# Patient Record
Sex: MALE | Race: WHITE | NOT HISPANIC OR LATINO | Employment: OTHER | URBAN - METROPOLITAN AREA
[De-identification: names, ages, dates, MRNs, and addresses within clinical notes are randomized per-mention and may not be internally consistent; named-entity substitution may affect disease eponyms.]

---

## 2018-01-18 ENCOUNTER — TELEPHONE (OUTPATIENT)
Dept: FAMILY MEDICINE CLINIC | Facility: CLINIC | Age: 73
End: 2018-01-18

## 2018-01-18 ENCOUNTER — OFFICE VISIT (OUTPATIENT)
Dept: FAMILY MEDICINE CLINIC | Facility: CLINIC | Age: 73
End: 2018-01-18

## 2018-01-18 VITALS
WEIGHT: 176 LBS | DIASTOLIC BLOOD PRESSURE: 66 MMHG | OXYGEN SATURATION: 96 % | SYSTOLIC BLOOD PRESSURE: 114 MMHG | HEART RATE: 70 BPM

## 2018-01-18 DIAGNOSIS — I63.9 CEREBROVASCULAR ACCIDENT (CVA), UNSPECIFIED MECHANISM (HCC): ICD-10-CM

## 2018-01-18 DIAGNOSIS — I25.10 CORONARY ARTERY DISEASE INVOLVING NATIVE HEART WITHOUT ANGINA PECTORIS, UNSPECIFIED VESSEL OR LESION TYPE: Primary | ICD-10-CM

## 2018-01-18 DIAGNOSIS — F17.200 TOBACCO DEPENDENCE: ICD-10-CM

## 2018-01-18 DIAGNOSIS — E78.5 HYPERLIPIDEMIA, UNSPECIFIED HYPERLIPIDEMIA TYPE: ICD-10-CM

## 2018-01-18 DIAGNOSIS — J43.9 PULMONARY EMPHYSEMA, UNSPECIFIED EMPHYSEMA TYPE (HCC): ICD-10-CM

## 2018-01-18 DIAGNOSIS — I44.2 COMPLETE HEART BLOCK (HCC): ICD-10-CM

## 2018-01-18 DIAGNOSIS — I10 HYPERTENSION, UNSPECIFIED TYPE: ICD-10-CM

## 2018-01-18 PROBLEM — J44.9 COPD (CHRONIC OBSTRUCTIVE PULMONARY DISEASE) (HCC): Status: ACTIVE | Noted: 2018-01-18

## 2018-01-18 PROBLEM — I73.9 PVD (PERIPHERAL VASCULAR DISEASE) (HCC): Status: ACTIVE | Noted: 2018-01-18

## 2018-01-18 PROBLEM — IMO0002 SQUAMOUS CELL CARCINOMA: Status: ACTIVE | Noted: 2018-01-18

## 2018-01-18 PROCEDURE — 99214 OFFICE O/P EST MOD 30 MIN: CPT | Performed by: INTERNAL MEDICINE

## 2018-01-18 RX ORDER — RAMIPRIL 2.5 MG/1
2.5 CAPSULE ORAL DAILY
COMMUNITY
Start: 2018-01-03 | End: 2018-02-27 | Stop reason: SDUPTHER

## 2018-01-18 RX ORDER — METOPROLOL SUCCINATE 50 MG/1
25 TABLET, EXTENDED RELEASE ORAL DAILY
Refills: 0 | COMMUNITY
Start: 2017-12-21 | End: 2018-01-18 | Stop reason: DRUGHIGH

## 2018-01-18 RX ORDER — GEMFIBROZIL 600 MG/1
600 TABLET, FILM COATED ORAL 2 TIMES DAILY
COMMUNITY
Start: 2017-11-23 | End: 2018-02-27 | Stop reason: SDUPTHER

## 2018-01-18 RX ORDER — CLOPIDOGREL BISULFATE 75 MG/1
75 TABLET ORAL DAILY
COMMUNITY
Start: 2017-11-21 | End: 2018-02-27 | Stop reason: SDUPTHER

## 2018-01-18 RX ORDER — HYDROCHLOROTHIAZIDE 12.5 MG/1
12.5 TABLET ORAL DAILY
COMMUNITY
Start: 2017-11-08 | End: 2018-01-18

## 2018-01-18 RX ORDER — ROSUVASTATIN CALCIUM 40 MG/1
40 TABLET, COATED ORAL DAILY
COMMUNITY
Start: 2017-11-04 | End: 2018-02-27 | Stop reason: SDUPTHER

## 2018-01-18 RX ORDER — CILOSTAZOL 100 MG/1
100 TABLET ORAL 2 TIMES DAILY
COMMUNITY
Start: 2017-11-08 | End: 2018-02-27 | Stop reason: SDUPTHER

## 2018-01-18 RX ORDER — EZETIMIBE 10 MG/1
10 TABLET ORAL DAILY
COMMUNITY
Start: 2017-11-23 | End: 2018-01-18

## 2018-01-18 RX ORDER — NICOTINE 21 MG/24HR
1 PATCH, TRANSDERMAL 24 HOURS TRANSDERMAL EVERY 24 HOURS
Qty: 28 PATCH | Refills: 2 | Status: SHIPPED | OUTPATIENT
Start: 2018-01-18 | End: 2019-05-07 | Stop reason: SDUPTHER

## 2018-01-18 RX ORDER — ALBUTEROL SULFATE 90 UG/1
AEROSOL, METERED RESPIRATORY (INHALATION)
Refills: 0 | COMMUNITY
Start: 2017-12-21 | End: 2020-01-01 | Stop reason: SDUPTHER

## 2018-01-18 NOTE — TELEPHONE ENCOUNTER
PT'S WIFE CALLED BACK CONCERNING A CORRECTION ON MEDS, THE METROPOLOL 50MG 1PO QD SHOULD BE 25MG 1PO QD PLEASE CORRECT. THE SUMMARY GIVEN AT CHECK OUT IT SAYS METROPOLOL 50MG ITS WRONG PER PT. NOTHING TO BE ORDERED.

## 2018-01-18 NOTE — TELEPHONE ENCOUNTER
Current med list has Metoprolol 50mg 1/2 po qd, which is 25mg.     I have updated the med list to reflect 25mg qd.

## 2018-01-18 NOTE — PROGRESS NOTES
72M here to establish care.  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  11/17 - hospitalized in [place] for stroke, presented with slurred speech and gait disturbance.  Started on plavix.  JR showed PFO .  Saw Dr. aCbello, cardiologist - carotid u/s done nad no significant stenosis.  ZIO patch done, frquent episodes of complete HB.  Has since had permanent pacemaker placement with Dr. Enrrique Nielson.    12/18/17 -12/20/17 - hospitalized [facility] for repeat of slurred speech and gait disturbance.  Presumed another TIA/ stroke.  CT brain with no acute findings, MRI brain could not be done due to pacer.  Continued on same meds.      Now pending appt with neurology for another opinion re: PFO closure.  Unknown source of stroke otherwise.        -home 1 month - states speech still hard at times/ comes and goes.  -strength improving and appetite picking up     1.  -diarrhea - chronic to certain extent  Worsening after 11/17 ct scan and 12/17 ct scan at [facility] - severe x 1-2days; this morning had episode of diarrhea; wife states often related to day after drinking etoh.    -immodium with good effect in past  colonoscpy about 10yrs prior,reported as normal    2. -asking about nebulizer - copd/wheezing, recent bronchitis -   Has albuterol inhaler that has helped; currently without cough , wheezing or sob beyond baseline.    3. Tobacco dependence -  Ha used chantix in past -   States patch - helped    Ros:  Review of Systems   General: no fatigue, fever/chills, unintentional wt loss, malaise, night sweats  Skin: no rash, no hives, no lesions,   Eyes: no visual disturbance   Heme: + brusing, no bleeding  ENT: no hearing loss, no dizziness, no nosebleed, no hoarseness  Endocrine: , no polyuria, polyphagia, polydipsia, no heat or cold intolerance  GI: no nausea, no vomiting, no constipation, no bleeding, no  pain  : no dysuria, no urinary frequency,, no hematuria, or incontinence  Extremities: no edema, , no claudication  Cardiac: no chest pain, no palpitations, no orthopnea, no PND  Respiratory: no cough, no sputum, no wheezing, no sob , no hemoptysis  Neuro: no headache, no seizure,, no paresthesias or weakness  Psych: no anxiety, no depression    Patient Active Problem List    Diagnosis   • CAD (coronary artery disease) [I25.10]     Overview Note:     CABG x 3, stent  Dr. Cabello - cardiology  Dr. Enrrique Nielson - ep cardiologist  Plendil, asa, plavix, lopid, metorplol, ramipril, rosuvastatin     • CVA (cerebral vascular accident) [I63.9]     Overview Note:     11/17 and 12/17 - slurred speech, gait difficulty- PFO present smal  Asa, plavix, statin     • Tobacco dependence [F17.200]     Overview Note:     1.5ppd x 60yrs, contemplating quitting.       • Complete heart block [I44.2]     Overview Note:     Pacer 12/15/17 - Dr. Enrrique Nielson     • Hyperlipidemia [E78.5]     Overview Note:     Rosuvastatin 40mg     • Hypertension [I10]   • COPD (chronic obstructive pulmonary disease) [J44.9]     Overview Note:     1/18: initiate spiriva, prn albuterol     • PVD (peripheral vascular disease) [I73.9]   • Squamous cell carcinoma [C44.92]     Overview Note:     S/p MOHS scalp , then extensive resection/ graft 3/17       Past Surgical History:   Procedure Laterality Date   • CORONARY ARTERY BYPASS GRAFT       Outpatient Prescriptions Marked as Taking for the 1/18/18 encounter (Office Visit) with Anna Marie Boyce, DO   Medication Sig Dispense Refill   • aspirin 81 MG tablet Take 81 mg by mouth Daily.     • cilostazol (PLETAL) 100 MG tablet 100 mg 2 (Two) Times a Day.     • clopidogrel (PLAVIX) 75 MG tablet 75 mg Daily.     • gemfibrozil (LOPID) 600 MG tablet 600 mg 2 (Two) Times a Day.     • metoprolol succinate XL (TOPROL-XL) 50 MG 24 hr tablet 25 mg Daily.  0   • ramipril (ALTACE) 2.5 MG capsule 2.5 mg Daily.     • rosuvastatin  (CRESTOR) 40 MG tablet 40 mg Daily.     • VENTOLIN  (90 Base) MCG/ACT inhaler inhale 2 puffs by mouth every 6 hours  0   • [DISCONTINUED] ezetimibe (ZETIA) 10 MG tablet 10 mg Daily.     • [DISCONTINUED] hydrochlorothiazide (HYDRODIURIL) 12.5 MG tablet 12.5 mg Daily.       No Known Allergies    Social History     Social History   • Marital status:      Spouse name: N/A   • Number of children: N/A   • Years of education: N/A     Social History Main Topics   • Smoking status: Current Every Day Smoker   • Smokeless tobacco: Never Used   • Alcohol use Yes   • Drug use: No   • Sexual activity: Not Asked     Other Topics Concern   • None     Social History Narrative   • None     No family history on file.    /66  Pulse 70  Wt 79.8 kg (176 lb)  SpO2 96%  Gen: well appearing elderly male  in nad, no resp effort at rest  Eyes: conjunctiva clear, perrl, eomi  ENT: mmm, no thyromegaly, no lymphadenopathy  CV: s1, s2 reg no m/r/g, no bruits, no jvd  No peripheral edema, pedal pulses intact  Resp:  clear b/l no w/r/r, diminished breath sounds diffusely  GI:  soft nt/nd  Skin: no clubbing or cyanosis  Neuro: no focal deficits.; gait stable    No results found for this or any previous visit.      Reviewed recent scanned labs from 11/17    A/P        1. Coronary artery disease involving native heart without angina pectoris, unspecified vessel or lesion type  - stable on current meds   2. Cerebrovascular accident (CVA), unspecified mechanism - on asa, plavix, statin, pending neuro consult re: PFO   3. Complete heart block   -pacer, stable   4. Hyperlipidemia, unspecified hyperlipidemia type -    5. Hypertension, unspecified type - cotrolled on current meds   6. Pulmonary emphysema, unspecified emphysema type - add spiriva respimat, prn albuterol    7. Tobacco dependence - counseled on cessation with benefits and risks of nicotine patch which pt thought helped while in hospital - nicotine patch prescribed, will  trial - advised not to smoke while patch on board.             F/u 3mo

## 2018-01-29 ENCOUNTER — TELEPHONE (OUTPATIENT)
Dept: FAMILY MEDICINE CLINIC | Facility: CLINIC | Age: 73
End: 2018-01-29

## 2018-01-29 RX ORDER — NITROGLYCERIN 0.4 MG/1
0.4 TABLET SUBLINGUAL
Qty: 30 TABLET | Refills: 1 | Status: SHIPPED | OUTPATIENT
Start: 2018-01-29 | End: 2019-05-07 | Stop reason: SDUPTHER

## 2018-01-29 NOTE — TELEPHONE ENCOUNTER
NITRO GLYCERION SL 0.4 MG, TAKES AS NEEDED, 30 DAY SUPPLY    [facility]

## 2018-02-16 ENCOUNTER — TELEPHONE (OUTPATIENT)
Dept: FAMILY MEDICINE CLINIC | Facility: CLINIC | Age: 73
End: 2018-02-16

## 2018-02-16 NOTE — TELEPHONE ENCOUNTER
PATIENTS WIFE SAYS METOPROLOL NEEDS TO BE CHANGED FROM 50 MG TO [facility]. A GOOD CALL BACK TO THE WIFE -[phone]. THANK YOU.

## 2018-02-27 RX ORDER — GEMFIBROZIL 600 MG/1
600 TABLET, FILM COATED ORAL 2 TIMES DAILY
Qty: 180 TABLET | Refills: 0 | Status: SHIPPED | OUTPATIENT
Start: 2018-02-27 | End: 2018-05-28 | Stop reason: SDUPTHER

## 2018-02-27 RX ORDER — RAMIPRIL 2.5 MG/1
2.5 CAPSULE ORAL DAILY
Qty: 90 CAPSULE | Refills: 0 | Status: SHIPPED | OUTPATIENT
Start: 2018-02-27 | End: 2018-06-18 | Stop reason: SDUPTHER

## 2018-02-27 RX ORDER — CLOPIDOGREL BISULFATE 75 MG/1
75 TABLET ORAL DAILY
Qty: 90 TABLET | Refills: 0 | Status: SHIPPED | OUTPATIENT
Start: 2018-02-27 | End: 2019-02-18 | Stop reason: SDUPTHER

## 2018-02-27 RX ORDER — ROSUVASTATIN CALCIUM 40 MG/1
40 TABLET, COATED ORAL DAILY
Qty: 90 TABLET | Refills: 0 | Status: SHIPPED | OUTPATIENT
Start: 2018-02-27 | End: 2018-07-09 | Stop reason: SDUPTHER

## 2018-02-27 RX ORDER — CILOSTAZOL 100 MG/1
100 TABLET ORAL 2 TIMES DAILY
Qty: 180 TABLET | Refills: 0 | Status: SHIPPED | OUTPATIENT
Start: 2018-02-27 | End: 2018-11-19 | Stop reason: SDUPTHER

## 2018-02-27 NOTE — TELEPHONE ENCOUNTER
E-prescribed Cilostazol 100 mg 1 po bid # 180 R 0  Clopidogrel 75 mg 1 po qd # 90 R 0   Gemfibrozil 600 mg 1 po bid # 180 R 0  Metoprolol Tartrate 25 mg 1 po qd # 90 R 0

## 2018-03-21 ENCOUNTER — TELEPHONE (OUTPATIENT)
Dept: FAMILY MEDICINE CLINIC | Facility: CLINIC | Age: 73
End: 2018-03-21

## 2018-03-21 NOTE — TELEPHONE ENCOUNTER
He has taken both ER and plain in the past. Currently has a script for plain. Wants to know if you have a preference or if there is one he should be taking?     Blood pressure readings have been WNL.

## 2018-03-21 NOTE — TELEPHONE ENCOUNTER
Wife called concerning metoprolol tartate 25 mg.. Is this what pt is to take an is he going to benefit from this with not taking the er release. Please call Lisa back.

## 2018-03-21 NOTE — TELEPHONE ENCOUNTER
The ER (usually toprol xl) once daily is easier to take so would go with that one.  Ok for him to finish what he has before starting this.    Anna Marie

## 2018-04-19 ENCOUNTER — LAB (OUTPATIENT)
Dept: LAB | Facility: HOSPITAL | Age: 73
End: 2018-04-19

## 2018-04-19 ENCOUNTER — OFFICE VISIT (OUTPATIENT)
Dept: FAMILY MEDICINE CLINIC | Facility: CLINIC | Age: 73
End: 2018-04-19

## 2018-04-19 VITALS
WEIGHT: 182 LBS | HEART RATE: 65 BPM | DIASTOLIC BLOOD PRESSURE: 90 MMHG | OXYGEN SATURATION: 98 % | SYSTOLIC BLOOD PRESSURE: 122 MMHG

## 2018-04-19 DIAGNOSIS — R73.9 HYPERGLYCEMIA: ICD-10-CM

## 2018-04-19 DIAGNOSIS — I63.9 CEREBROVASCULAR ACCIDENT (CVA), UNSPECIFIED MECHANISM (HCC): ICD-10-CM

## 2018-04-19 DIAGNOSIS — I25.10 CORONARY ARTERY DISEASE INVOLVING NATIVE HEART WITHOUT ANGINA PECTORIS, UNSPECIFIED VESSEL OR LESION TYPE: ICD-10-CM

## 2018-04-19 DIAGNOSIS — E78.5 HYPERLIPIDEMIA, UNSPECIFIED HYPERLIPIDEMIA TYPE: ICD-10-CM

## 2018-04-19 DIAGNOSIS — I10 HYPERTENSION, UNSPECIFIED TYPE: ICD-10-CM

## 2018-04-19 DIAGNOSIS — Z87.891 PERSONAL HISTORY OF TOBACCO USE, PRESENTING HAZARDS TO HEALTH: Primary | ICD-10-CM

## 2018-04-19 DIAGNOSIS — J43.9 PULMONARY EMPHYSEMA, UNSPECIFIED EMPHYSEMA TYPE (HCC): ICD-10-CM

## 2018-04-19 DIAGNOSIS — IMO0002 SQUAMOUS CELL CARCINOMA: ICD-10-CM

## 2018-04-19 DIAGNOSIS — F17.200 TOBACCO DEPENDENCE: ICD-10-CM

## 2018-04-19 LAB
ALBUMIN SERPL-MCNC: 4.7 G/DL (ref 3.2–4.8)
ALBUMIN/GLOB SERPL: 1.7 G/DL (ref 1.5–2.5)
ALP SERPL-CCNC: 107 U/L (ref 25–100)
ALT SERPL W P-5'-P-CCNC: 15 U/L (ref 7–40)
ANION GAP SERPL CALCULATED.3IONS-SCNC: 7 MMOL/L (ref 3–11)
ARTICHOKE IGE QN: 72 MG/DL (ref 0–130)
AST SERPL-CCNC: 24 U/L (ref 0–33)
BILIRUB SERPL-MCNC: 0.3 MG/DL (ref 0.3–1.2)
BUN BLD-MCNC: 13 MG/DL (ref 9–23)
BUN/CREAT SERPL: 11.8 (ref 7–25)
CALCIUM SPEC-SCNC: 9.6 MG/DL (ref 8.7–10.4)
CHLORIDE SERPL-SCNC: 107 MMOL/L (ref 99–109)
CHOLEST SERPL-MCNC: 140 MG/DL (ref 0–200)
CO2 SERPL-SCNC: 22 MMOL/L (ref 20–31)
CREAT BLD-MCNC: 1.1 MG/DL (ref 0.6–1.3)
DEPRECATED RDW RBC AUTO: 52.1 FL (ref 37–54)
ERYTHROCYTE [DISTWIDTH] IN BLOOD BY AUTOMATED COUNT: 15 % (ref 11.3–14.5)
GFR SERPL CREATININE-BSD FRML MDRD: 65 ML/MIN/1.73
GLOBULIN UR ELPH-MCNC: 2.7 GM/DL
GLUCOSE BLD-MCNC: 95 MG/DL (ref 70–100)
HBA1C MFR BLD: 6.3 % (ref 4.8–5.6)
HCT VFR BLD AUTO: 44.2 % (ref 38.9–50.9)
HDLC SERPL-MCNC: 52 MG/DL (ref 40–60)
HGB BLD-MCNC: 14.5 G/DL (ref 13.1–17.5)
MCH RBC QN AUTO: 31 PG (ref 27–31)
MCHC RBC AUTO-ENTMCNC: 32.8 G/DL (ref 32–36)
MCV RBC AUTO: 94.6 FL (ref 80–99)
PLATELET # BLD AUTO: 294 10*3/MM3 (ref 150–450)
PMV BLD AUTO: 11.7 FL (ref 6–12)
POTASSIUM BLD-SCNC: 4.9 MMOL/L (ref 3.5–5.5)
PROT SERPL-MCNC: 7.4 G/DL (ref 5.7–8.2)
RBC # BLD AUTO: 4.67 10*6/MM3 (ref 4.2–5.76)
SODIUM BLD-SCNC: 136 MMOL/L (ref 132–146)
TRIGL SERPL-MCNC: 84 MG/DL (ref 0–150)
WBC NRBC COR # BLD: 10.96 10*3/MM3 (ref 3.5–10.8)

## 2018-04-19 PROCEDURE — 85027 COMPLETE CBC AUTOMATED: CPT

## 2018-04-19 PROCEDURE — 80053 COMPREHEN METABOLIC PANEL: CPT

## 2018-04-19 PROCEDURE — 99214 OFFICE O/P EST MOD 30 MIN: CPT | Performed by: INTERNAL MEDICINE

## 2018-04-19 PROCEDURE — 80061 LIPID PANEL: CPT

## 2018-04-19 PROCEDURE — 83036 HEMOGLOBIN GLYCOSYLATED A1C: CPT

## 2018-04-19 PROCEDURE — 36415 COLL VENOUS BLD VENIPUNCTURE: CPT

## 2018-04-19 NOTE — PROGRESS NOTES
73M is here for f/u chronic issues:    S/p cva - still with some aphasia issues , mild, some improvement - pending speech therapy.    Skin cancer hx - lesions on face that wife points out - pt last seen by derm when he had extensive surgery with graft to scalp in 2016 - squamous cell ca    COPD - current smoker.  Pt states has not had ct chest in past for lung cancer screening, does not recall prior pfts.  States spiriva respimat seems to have helped his breathing.  + chronic cough, no production, no wheezing, no sob at rest.  Pt sendentary.       CAD, s/p pacer for CHB - recent cardiology visit - stable , no changes.    Tob dependence - has not yet quit/ given nicotine patch last visit    Review of Systems   General: no fatigue, fever/chills, unintentional wt loss, malaise, night sweats  Skin: no rash, no hives, no lesions,   Eyes: no visual disturbance   Heme: no brusing, no bleeding  ENT: no hearing loss, no dizziness, no nosebleed, no hoarseness  Endocrine: , no polyuria, polyphagia, polydipsia, no heat or cold intolerance  GI: no nausea, no vomiting, no diarrhea, no constipation, no bleeding, no pain  : no dysuria, no urinary frequency,, no hematuria, or incontinence  Extremities: no edema, , no claudication  Cardiac: no chest pain, no palpitations, no orthopnea, no PND  Respiratory: no cough, no sputum, no wheezing, no sob , no hemoptysis  Neuro: no headache, no seizure,, no paresthesias or weakness  Psych: no anxiety, no depression      Patient Active Problem List    Diagnosis   • CAD (coronary artery disease) [I25.10]     Overview Note:     CABG x 3, stent  Dr. Cabello - cardiology  Dr. Enrrique Nielson - ep cardiologist  Plendil, asa, plavix, lopid, metorplol, ramipril, rosuvastatin     • CVA (cerebral vascular accident) [I63.9]     Overview Note:     11/17 and 12/17 - slurred speech, gait difficulty- PFO present smal  Asa, plavix, statin     • Tobacco dependence [F17.200]     Overview Note:     1.5ppd x 60yrs,  contemplating quitting.       • Complete heart block [I44.2]     Overview Note:     Pacer 12/15/17 - Dr. Enrrique Nielson     • Hyperlipidemia [E78.5]     Overview Note:     Rosuvastatin 40mg     • Hypertension [I10]   • COPD (chronic obstructive pulmonary disease) [J44.9]     Overview Note:     1/18: initiate spiriva, prn albuterol     • PVD (peripheral vascular disease) [I73.9]   • Squamous cell carcinoma [C44.92]     Overview Note:     S/p MOHS scalp , then extensive resection/ graft 3/17       Past Surgical History:   Procedure Laterality Date   • CORONARY ARTERY BYPASS GRAFT       Current Outpatient Prescriptions   Medication Sig Dispense Refill   • aspirin 81 MG tablet Take 81 mg by mouth Daily.     • cilostazol (PLETAL) 100 MG tablet Take 1 tablet by mouth 2 (Two) Times a Day. 180 tablet 0   • clopidogrel (PLAVIX) 75 MG tablet Take 1 tablet by mouth Daily. 90 tablet 0   • gemfibrozil (LOPID) 600 MG tablet Take 1 tablet by mouth 2 (Two) Times a Day. 180 tablet 0   • metoprolol tartrate (LOPRESSOR) 25 MG tablet Take 1 tablet by mouth Daily. 90 tablet 0   • nicotine (EQL NICOTINE) 21 MG/24HR patch Place 1 patch on the skin Daily. 28 patch 2   • nitroglycerin (NITROSTAT) 0.4 MG SL tablet Place 1 tablet under the tongue Every 5 (Five) Minutes As Needed for Chest Pain. Take no more than 3 doses in 15 minutes. 30 tablet 1   • ramipril (ALTACE) 2.5 MG capsule Take 1 capsule by mouth Daily. 90 capsule 0   • rosuvastatin (CRESTOR) 40 MG tablet Take 1 tablet by mouth Daily. 90 tablet 0   • Tiotropium Bromide Monohydrate 2.5 MCG/ACT aerosol solution Inhale 2 each Daily. 1 inhaler 2   • VENTOLIN  (90 Base) MCG/ACT inhaler inhale 2 puffs by mouth every 6 hours  0     No current facility-administered medications for this visit.      No Known Allergies    Social History     Social History   • Marital status:      Social History Main Topics   • Smoking status: Current Every Day Smoker   • Smokeless tobacco: Never  Used   • Alcohol use Yes   • Drug use: No   • Sexual activity: Defer     Other Topics Concern   • Not on file     /90   Pulse 65   Wt 82.6 kg (182 lb)   SpO2 98%   Gen: well appearing in nad, no resp effort  Eyes: conjunctiva clear, perrl, eomi  ENT: mmm, no thyromegaly, no lymphadenopathy  CV: s1, s2 reg 2/6 systolic murmur;  no bruits, no jvd  No peripheral edema, pedal pulses intact  Resp:  clear b/l no w/r/r  GI:  soft nt/nd  Skin: face with several small scaling lesions c/w actinics, dry skin diffusely, forearms with chronic appearing changes/ petechial changes  Neuro: alert, ox3  CN 2-12 intact  Motor and sensory grossly intact  Gait stable    A/P    1. Personal history of tobacco use, presenting hazards to health    2. Hyperglycemia    3. Coronary artery disease involving native heart without angina pectoris, unspecified vessel or lesion type    4. Cerebrovascular accident (CVA), unspecified mechanism    5. Hyperlipidemia, unspecified hyperlipidemia type    6. Hypertension, unspecified type    7. Pulmonary emphysema, unspecified emphysema type    8. Squamous cell carcinoma    9. Tobacco dependence        Counseled on tobacco cessation  Labs ordered  Will change inhaler to Stiolto - samples given  CT chest low dose for lung cancer screening  To schedule with dermatology      F/u 3mo medicare wellness

## 2018-04-19 NOTE — PATIENT INSTRUCTIONS
Stop the spiriva.  Start the stiolto - 2 inhalations once daily .    Schedule CT chest - lung cancer screening  Schedule with dermatology  Labs today

## 2018-04-25 ENCOUNTER — HOSPITAL ENCOUNTER (OUTPATIENT)
Dept: CT IMAGING | Facility: HOSPITAL | Age: 73
Discharge: HOME | End: 2018-04-25
Admitting: INTERNAL MEDICINE

## 2018-04-25 DIAGNOSIS — Z87.891 PERSONAL HISTORY OF TOBACCO USE, PRESENTING HAZARDS TO HEALTH: ICD-10-CM

## 2018-04-25 PROCEDURE — G0297 LDCT FOR LUNG CA SCREEN: HCPCS

## 2018-04-30 ENCOUNTER — TELEPHONE (OUTPATIENT)
Dept: FAMILY MEDICINE CLINIC | Facility: CLINIC | Age: 73
End: 2018-04-30

## 2018-05-29 RX ORDER — GEMFIBROZIL 600 MG/1
TABLET, FILM COATED ORAL
Qty: 180 TABLET | Refills: 1 | Status: SHIPPED | OUTPATIENT
Start: 2018-05-29 | End: 2018-11-25 | Stop reason: SDUPTHER

## 2018-06-18 RX ORDER — RAMIPRIL 2.5 MG/1
2.5 CAPSULE ORAL DAILY
Qty: 90 CAPSULE | Refills: 0 | Status: SHIPPED | OUTPATIENT
Start: 2018-06-18 | End: 2018-09-24 | Stop reason: SDUPTHER

## 2018-06-25 ENCOUNTER — OFFICE VISIT (OUTPATIENT)
Dept: FAMILY MEDICINE CLINIC | Facility: CLINIC | Age: 73
End: 2018-06-25

## 2018-06-25 VITALS
WEIGHT: 181 LBS | HEART RATE: 77 BPM | TEMPERATURE: 97.6 F | OXYGEN SATURATION: 98 % | DIASTOLIC BLOOD PRESSURE: 88 MMHG | SYSTOLIC BLOOD PRESSURE: 134 MMHG

## 2018-06-25 DIAGNOSIS — J44.1 COPD WITH ACUTE EXACERBATION (HCC): Primary | ICD-10-CM

## 2018-06-25 PROCEDURE — 99213 OFFICE O/P EST LOW 20 MIN: CPT | Performed by: PHYSICIAN ASSISTANT

## 2018-06-25 RX ORDER — PREDNISONE 20 MG/1
TABLET ORAL
Qty: 20 TABLET | Refills: 0 | Status: SHIPPED | OUTPATIENT
Start: 2018-06-25 | End: 2019-05-07

## 2018-06-25 RX ORDER — SULFAMETHOXAZOLE AND TRIMETHOPRIM 800; 160 MG/1; MG/1
1 TABLET ORAL 2 TIMES DAILY
Qty: 20 TABLET | Refills: 0 | Status: SHIPPED | OUTPATIENT
Start: 2018-06-25 | End: 2019-05-07

## 2018-06-25 RX ORDER — SULFAMETHOXAZOLE AND TRIMETHOPRIM 800; 160 MG/1; MG/1
1 TABLET ORAL 2 TIMES DAILY
Qty: 20 TABLET | Refills: 0 | Status: SHIPPED | OUTPATIENT
Start: 2018-06-25 | End: 2018-06-25 | Stop reason: SDUPTHER

## 2018-06-25 RX ORDER — PREDNISONE 20 MG/1
TABLET ORAL
Qty: 20 TABLET | Refills: 0 | Status: SHIPPED | OUTPATIENT
Start: 2018-06-25 | End: 2018-06-25 | Stop reason: SDUPTHER

## 2018-06-25 NOTE — PROGRESS NOTES
Chief Complaint   Patient presents with   • Sinusitis     ongoing since last week    • Cough       HPI      Ceasar Woodard is a 73 y.o. male who presents for Sinusitis (ongoing since last week ) and Cough.  Patient is present with his wife.  They report that their grandkids came and visited and were recently diagnosed with bronchitis.  Patient is a daily pack a day smoker.  He reports cough starting 6 days ago.  The cough is mostly dry with episodic clear sputum.  He denies any ear pain, facial pain, nasal drainage, sore throat, fever, chills or shortness of breath.  He is using an inhaler at home but is unsure of the brand.  He is also taking Mucinex D.  He denies any antibiotic allergies.  Past Medical History:   Diagnosis Date   • Hypertension        Past Surgical History:   Procedure Laterality Date   • CORONARY ARTERY BYPASS GRAFT         Family History   Problem Relation Age of Onset   • COPD Mother    • Alzheimer's disease Mother    • Stroke Father    • Heart attack Father    • COPD Father        Social History     Social History   • Marital status:      Spouse name: N/A   • Number of children: N/A   • Years of education: N/A     Occupational History   • Not on file.     Social History Main Topics   • Smoking status: Current Every Day Smoker   • Smokeless tobacco: Never Used   • Alcohol use Yes   • Drug use: No   • Sexual activity: Defer     Other Topics Concern   • Not on file     Social History Narrative   • No narrative on file       No Known Allergies    ROS    Review of Systems   Constitutional: Negative for chills and fever.   HENT: Positive for nosebleeds. Negative for congestion, ear pain, postnasal drip, rhinorrhea, sinus pressure and sore throat.    Respiratory: Positive for cough. Negative for shortness of breath.    Cardiovascular: Negative for leg swelling.   Gastrointestinal: Positive for diarrhea.       Vitals:    06/25/18 1115   BP: 134/88   BP Location: Right arm   Patient Position:  Sitting   Cuff Size: Adult   Pulse: 77   Temp: 97.6 °F (36.4 °C)   SpO2: 98%   Weight: 82.1 kg (181 lb)         Current Outpatient Prescriptions:   •  aspirin 81 MG tablet, Take 81 mg by mouth Daily., Disp: , Rfl:   •  cilostazol (PLETAL) 100 MG tablet, Take 1 tablet by mouth 2 (Two) Times a Day., Disp: 180 tablet, Rfl: 0  •  clopidogrel (PLAVIX) 75 MG tablet, Take 1 tablet by mouth Daily., Disp: 90 tablet, Rfl: 0  •  gemfibrozil (LOPID) 600 MG tablet, TAKE 1 TABLET TWICE A DAY, Disp: 180 tablet, Rfl: 1  •  metoprolol tartrate (LOPRESSOR) 25 MG tablet, TAKE 1 TABLET DAILY, Disp: 90 tablet, Rfl: 1  •  nicotine (EQL NICOTINE) 21 MG/24HR patch, Place 1 patch on the skin Daily., Disp: 28 patch, Rfl: 2  •  nitroglycerin (NITROSTAT) 0.4 MG SL tablet, Place 1 tablet under the tongue Every 5 (Five) Minutes As Needed for Chest Pain. Take no more than 3 doses in 15 minutes., Disp: 30 tablet, Rfl: 1  •  ramipril (ALTACE) 2.5 MG capsule, Take 1 capsule by mouth Daily., Disp: 90 capsule, Rfl: 0  •  rosuvastatin (CRESTOR) 40 MG tablet, Take 1 tablet by mouth Daily., Disp: 90 tablet, Rfl: 0  •  Tiotropium Bromide Monohydrate 2.5 MCG/ACT aerosol solution, Inhale 2 each Daily., Disp: 1 inhaler, Rfl: 2  •  VENTOLIN  (90 Base) MCG/ACT inhaler, inhale 2 puffs by mouth every 6 hours, Disp: , Rfl: 0  •  predniSONE (DELTASONE) 20 MG tablet, Take 3 tablets (60 mg) x3 days, then 2 tablets (40 mg) x3 days, then 1 tablet (20 mg) x3 days, then 0.5 tablet (10 mg) x 4 days, Disp: 20 tablet, Rfl: 0  •  sulfamethoxazole-trimethoprim (BACTRIM DS) 800-160 MG per tablet, Take 1 tablet by mouth 2 (Two) Times a Day., Disp: 20 tablet, Rfl: 0    PE    Physical Exam   Constitutional: He is oriented to person, place, and time. Vital signs are normal. He appears well-developed and well-nourished. No distress.   HENT:   Head: Normocephalic.   Eyes: Conjunctivae are normal.   Neck: Normal range of motion.   Cardiovascular: Normal rate, regular rhythm  and normal heart sounds.    Pulmonary/Chest: Effort normal. No respiratory distress. He has wheezes. He has rales.   Diminished breath sounds throughout with inspiratory wheezing throughout lungs.  Rhonci congestive cough in BLL.   Musculoskeletal: Normal range of motion. He exhibits no edema.   Neurological: He is alert and oriented to person, place, and time.   Skin: Skin is warm. No rash noted. He is not diaphoretic. No erythema.   Psychiatric: He has a normal mood and affect. His behavior is normal. Judgment and thought content normal.   Vitals reviewed.       A/P    Ceasar was seen today for sinusitis and cough.    Diagnoses and all orders for this visit:    COPD with acute exacerbation  -reports dry cough x6 days with no other significant symptoms  -smokes a pack/day   -lungs have inspiratory wheezing with a rhonci cough  -recommend taking mucinex (without D)  -will prescribe steroids and bactrim  -no antibiotic allergies  -patient has inhaler he uses for COPD, unsure of name but is using daily and has rescue inhaler  -     predniSONE (DELTASONE) 20 MG tablet; Take 3 tablets (60 mg) x3 days, then 2 tablets (40 mg) x3 days, then 1 tablet (20 mg) x3 days, then 0.5 tablet (10 mg) x 4 days  -     sulfamethoxazole-trimethoprim (BACTRIM DS) 800-160 MG per tablet; Take 1 tablet by mouth 2 (Two) Times a Day.         Plan of care reviewed with patient at the conclusion of today's visit. Education was provided regarding diagnosis, management and any prescribed or recommended OTC medications.  Patient verbalizes understanding of and agreement with management plan.    No Follow-up on file.     Prema Frias PA-C

## 2018-07-09 RX ORDER — ROSUVASTATIN CALCIUM 40 MG/1
TABLET, COATED ORAL
Qty: 90 TABLET | Refills: 0 | Status: SHIPPED | OUTPATIENT
Start: 2018-07-09 | End: 2018-10-07 | Stop reason: SDUPTHER

## 2018-07-19 ENCOUNTER — TELEPHONE (OUTPATIENT)
Dept: FAMILY MEDICINE CLINIC | Facility: CLINIC | Age: 73
End: 2018-07-19

## 2018-09-24 RX ORDER — RAMIPRIL 2.5 MG/1
CAPSULE ORAL
Qty: 90 CAPSULE | Refills: 1 | Status: SHIPPED | OUTPATIENT
Start: 2018-09-24 | End: 2019-05-07 | Stop reason: SDUPTHER

## 2018-10-08 RX ORDER — ROSUVASTATIN CALCIUM 40 MG/1
TABLET, COATED ORAL
Qty: 90 TABLET | Refills: 0 | Status: SHIPPED | OUTPATIENT
Start: 2018-10-08 | End: 2019-01-05 | Stop reason: SDUPTHER

## 2018-11-19 ENCOUNTER — TELEPHONE (OUTPATIENT)
Dept: FAMILY MEDICINE CLINIC | Facility: CLINIC | Age: 73
End: 2018-11-19

## 2018-11-19 RX ORDER — CILOSTAZOL 100 MG/1
100 TABLET ORAL 2 TIMES DAILY
Qty: 28 TABLET | Refills: 0 | Status: SHIPPED | OUTPATIENT
Start: 2018-11-19 | End: 2018-12-10 | Stop reason: SDUPTHER

## 2018-11-26 RX ORDER — GEMFIBROZIL 600 MG/1
TABLET, FILM COATED ORAL
Qty: 180 TABLET | Refills: 1 | Status: SHIPPED | OUTPATIENT
Start: 2018-11-26 | End: 2019-05-07 | Stop reason: SDUPTHER

## 2018-12-10 ENCOUNTER — TELEPHONE (OUTPATIENT)
Dept: FAMILY MEDICINE CLINIC | Facility: CLINIC | Age: 73
End: 2018-12-10

## 2018-12-10 RX ORDER — CILOSTAZOL 100 MG/1
100 TABLET ORAL 2 TIMES DAILY
Qty: 60 TABLET | Refills: 0 | Status: SHIPPED | OUTPATIENT
Start: 2018-12-10 | End: 2018-12-20 | Stop reason: SDUPTHER

## 2018-12-10 RX ORDER — CILOSTAZOL 100 MG/1
100 TABLET ORAL 2 TIMES DAILY
Qty: 28 TABLET | Refills: 0 | Status: SHIPPED | OUTPATIENT
Start: 2018-12-10 | End: 2018-12-10 | Stop reason: SDUPTHER

## 2018-12-10 NOTE — TELEPHONE ENCOUNTER
CILOSTAZOL 100 MG 2 TIMES A DAY 90 DAY SUPPLY        [facility]         REQUESTING A 14 DAY SUPPLY TO BE SENT TO [facility], BEFORE MEDICATIONS COME IN THROUGH [facility].       PT IS VIJAY OUT OF MEDICATION

## 2018-12-21 RX ORDER — CILOSTAZOL 100 MG/1
100 TABLET ORAL 2 TIMES DAILY
Qty: 180 TABLET | Refills: 1 | Status: SHIPPED | OUTPATIENT
Start: 2018-12-21 | End: 2019-03-18 | Stop reason: SDUPTHER

## 2019-01-01 ENCOUNTER — OFFICE VISIT (OUTPATIENT)
Dept: FAMILY MEDICINE CLINIC | Facility: CLINIC | Age: 74
End: 2019-01-01

## 2019-01-01 ENCOUNTER — TELEPHONE (OUTPATIENT)
Dept: FAMILY MEDICINE CLINIC | Facility: CLINIC | Age: 74
End: 2019-01-01

## 2019-01-01 VITALS
HEIGHT: 70 IN | BODY MASS INDEX: 26.69 KG/M2 | WEIGHT: 186.4 LBS | OXYGEN SATURATION: 97 % | SYSTOLIC BLOOD PRESSURE: 122 MMHG | DIASTOLIC BLOOD PRESSURE: 80 MMHG | HEART RATE: 81 BPM

## 2019-01-01 DIAGNOSIS — C76.0 SQUAMOUS CELL CARCINOMA OF HEAD AND NECK (HCC): ICD-10-CM

## 2019-01-01 DIAGNOSIS — I10 ESSENTIAL HYPERTENSION: ICD-10-CM

## 2019-01-01 DIAGNOSIS — R41.0 CONFUSION: ICD-10-CM

## 2019-01-01 DIAGNOSIS — R53.83 OTHER FATIGUE: ICD-10-CM

## 2019-01-01 DIAGNOSIS — G89.29 CHRONIC PAIN OF RIGHT KNEE: ICD-10-CM

## 2019-01-01 DIAGNOSIS — M25.561 CHRONIC PAIN OF RIGHT KNEE: ICD-10-CM

## 2019-01-01 DIAGNOSIS — F17.200 TOBACCO DEPENDENCE: ICD-10-CM

## 2019-01-01 DIAGNOSIS — J43.9 PULMONARY EMPHYSEMA, UNSPECIFIED EMPHYSEMA TYPE (HCC): ICD-10-CM

## 2019-01-01 DIAGNOSIS — J44.1 COPD EXACERBATION (HCC): Primary | ICD-10-CM

## 2019-01-01 DIAGNOSIS — K13.0 LESION OF LIP: ICD-10-CM

## 2019-01-01 PROCEDURE — 99214 OFFICE O/P EST MOD 30 MIN: CPT | Performed by: PHYSICIAN ASSISTANT

## 2019-01-01 PROCEDURE — 94640 AIRWAY INHALATION TREATMENT: CPT | Performed by: PHYSICIAN ASSISTANT

## 2019-01-01 RX ORDER — IPRATROPIUM BROMIDE AND ALBUTEROL SULFATE 2.5; .5 MG/3ML; MG/3ML
3 SOLUTION RESPIRATORY (INHALATION)
Status: DISCONTINUED | OUTPATIENT
Start: 2019-01-01 | End: 2019-01-01

## 2019-01-01 RX ORDER — IPRATROPIUM BROMIDE AND ALBUTEROL SULFATE 2.5; .5 MG/3ML; MG/3ML
3 SOLUTION RESPIRATORY (INHALATION) EVERY 4 HOURS PRN
Qty: 360 ML | Refills: 1 | Status: SHIPPED | OUTPATIENT
Start: 2019-01-01 | End: 2020-01-01 | Stop reason: SDUPTHER

## 2019-01-01 RX ORDER — CEFDINIR 300 MG/1
300 CAPSULE ORAL EVERY 12 HOURS
Qty: 20 CAPSULE | Refills: 0 | Status: SHIPPED | OUTPATIENT
Start: 2019-01-01 | End: 2020-01-01

## 2019-01-01 RX ORDER — IPRATROPIUM BROMIDE AND ALBUTEROL SULFATE 2.5; .5 MG/3ML; MG/3ML
3 SOLUTION RESPIRATORY (INHALATION)
Status: DISCONTINUED | OUTPATIENT
Start: 2019-01-01 | End: 2020-01-01

## 2019-01-01 RX ADMIN — IPRATROPIUM BROMIDE AND ALBUTEROL SULFATE 3 ML: 2.5; .5 SOLUTION RESPIRATORY (INHALATION) at 11:26

## 2019-01-05 RX ORDER — ROSUVASTATIN CALCIUM 40 MG/1
TABLET, COATED ORAL
Qty: 90 TABLET | Refills: 0 | Status: SHIPPED | OUTPATIENT
Start: 2019-01-05 | End: 2019-04-10 | Stop reason: SDUPTHER

## 2019-02-19 RX ORDER — CLOPIDOGREL BISULFATE 75 MG/1
75 TABLET ORAL DAILY
Qty: 90 TABLET | Refills: 0 | Status: SHIPPED | OUTPATIENT
Start: 2019-02-19 | End: 2019-05-07 | Stop reason: SDUPTHER

## 2019-03-18 RX ORDER — CILOSTAZOL 100 MG/1
100 TABLET ORAL 2 TIMES DAILY
Qty: 180 TABLET | Refills: 0 | Status: SHIPPED | OUTPATIENT
Start: 2019-03-18 | End: 2019-05-07

## 2019-04-10 RX ORDER — ROSUVASTATIN CALCIUM 40 MG/1
TABLET, COATED ORAL
Qty: 90 TABLET | Refills: 0 | Status: SHIPPED | OUTPATIENT
Start: 2019-04-10 | End: 2019-05-07 | Stop reason: SDUPTHER

## 2019-05-07 ENCOUNTER — APPOINTMENT (OUTPATIENT)
Dept: LAB | Facility: HOSPITAL | Age: 74
End: 2019-05-07

## 2019-05-07 ENCOUNTER — OFFICE VISIT (OUTPATIENT)
Dept: FAMILY MEDICINE CLINIC | Facility: CLINIC | Age: 74
End: 2019-05-07

## 2019-05-07 VITALS
WEIGHT: 173.2 LBS | DIASTOLIC BLOOD PRESSURE: 74 MMHG | HEIGHT: 70 IN | HEART RATE: 61 BPM | BODY MASS INDEX: 24.79 KG/M2 | OXYGEN SATURATION: 96 % | SYSTOLIC BLOOD PRESSURE: 114 MMHG

## 2019-05-07 DIAGNOSIS — I63.039 CEREBROVASCULAR ACCIDENT (CVA) DUE TO THROMBOSIS OF CAROTID ARTERY, UNSPECIFIED BLOOD VESSEL LATERALITY (HCC): ICD-10-CM

## 2019-05-07 DIAGNOSIS — F17.210 CIGARETTE SMOKER: ICD-10-CM

## 2019-05-07 DIAGNOSIS — J43.9 PULMONARY EMPHYSEMA, UNSPECIFIED EMPHYSEMA TYPE (HCC): ICD-10-CM

## 2019-05-07 DIAGNOSIS — F17.200 TOBACCO DEPENDENCE: ICD-10-CM

## 2019-05-07 DIAGNOSIS — E78.2 MIXED HYPERLIPIDEMIA: ICD-10-CM

## 2019-05-07 DIAGNOSIS — Z00.00 MEDICARE ANNUAL WELLNESS VISIT, SUBSEQUENT: Primary | ICD-10-CM

## 2019-05-07 DIAGNOSIS — I10 HYPERTENSION, UNSPECIFIED TYPE: ICD-10-CM

## 2019-05-07 DIAGNOSIS — I25.10 CORONARY ARTERY DISEASE INVOLVING NATIVE CORONARY ARTERY OF NATIVE HEART WITHOUT ANGINA PECTORIS: ICD-10-CM

## 2019-05-07 DIAGNOSIS — Z12.5 SPECIAL SCREENING, PROSTATE CANCER: ICD-10-CM

## 2019-05-07 DIAGNOSIS — I44.2 COMPLETE HEART BLOCK (HCC): ICD-10-CM

## 2019-05-07 DIAGNOSIS — I73.9 PVD (PERIPHERAL VASCULAR DISEASE) (HCC): ICD-10-CM

## 2019-05-07 DIAGNOSIS — Z12.11 SCREEN FOR COLON CANCER: ICD-10-CM

## 2019-05-07 DIAGNOSIS — C76.0 SQUAMOUS CELL CARCINOMA OF HEAD AND NECK (HCC): ICD-10-CM

## 2019-05-07 LAB
ALBUMIN SERPL-MCNC: 4.5 G/DL (ref 3.5–5.2)
ALBUMIN/GLOB SERPL: 1.3 G/DL
ALP SERPL-CCNC: 105 U/L (ref 39–117)
ALT SERPL W P-5'-P-CCNC: 11 U/L (ref 1–41)
ANION GAP SERPL CALCULATED.3IONS-SCNC: 11 MMOL/L
AST SERPL-CCNC: 21 U/L (ref 1–40)
BASOPHILS # BLD AUTO: 0.09 10*3/MM3 (ref 0–0.2)
BASOPHILS NFR BLD AUTO: 0.9 % (ref 0–1.5)
BILIRUB SERPL-MCNC: 0.3 MG/DL (ref 0.2–1.2)
BUN BLD-MCNC: 12 MG/DL (ref 8–23)
BUN/CREAT SERPL: 12 (ref 7–25)
CALCIUM SPEC-SCNC: 9.6 MG/DL (ref 8.6–10.5)
CHLORIDE SERPL-SCNC: 106 MMOL/L (ref 98–107)
CHOLEST SERPL-MCNC: 148 MG/DL (ref 0–200)
CO2 SERPL-SCNC: 21 MMOL/L (ref 22–29)
CREAT BLD-MCNC: 1 MG/DL (ref 0.76–1.27)
DEPRECATED RDW RBC AUTO: 56.6 FL (ref 37–54)
EOSINOPHIL # BLD AUTO: 0.29 10*3/MM3 (ref 0–0.4)
EOSINOPHIL NFR BLD AUTO: 2.8 % (ref 0.3–6.2)
ERYTHROCYTE [DISTWIDTH] IN BLOOD BY AUTOMATED COUNT: 14.9 % (ref 12.3–15.4)
GFR SERPL CREATININE-BSD FRML MDRD: 73 ML/MIN/1.73
GLOBULIN UR ELPH-MCNC: 3.4 GM/DL
GLUCOSE BLD-MCNC: 93 MG/DL (ref 65–99)
HCT VFR BLD AUTO: 48.3 % (ref 37.5–51)
HDLC SERPL-MCNC: 54 MG/DL (ref 40–60)
HGB BLD-MCNC: 15.1 G/DL (ref 13–17.7)
IMM GRANULOCYTES # BLD AUTO: 0.08 10*3/MM3 (ref 0–0.05)
IMM GRANULOCYTES NFR BLD AUTO: 0.8 % (ref 0–0.5)
LDLC SERPL CALC-MCNC: 79 MG/DL (ref 0–100)
LDLC/HDLC SERPL: 1.47 {RATIO}
LYMPHOCYTES # BLD AUTO: 3.56 10*3/MM3 (ref 0.7–3.1)
LYMPHOCYTES NFR BLD AUTO: 33.9 % (ref 19.6–45.3)
MCH RBC QN AUTO: 31.7 PG (ref 26.6–33)
MCHC RBC AUTO-ENTMCNC: 31.3 G/DL (ref 31.5–35.7)
MCV RBC AUTO: 101.3 FL (ref 79–97)
MONOCYTES # BLD AUTO: 0.69 10*3/MM3 (ref 0.1–0.9)
MONOCYTES NFR BLD AUTO: 6.6 % (ref 5–12)
NEUTROPHILS # BLD AUTO: 5.79 10*3/MM3 (ref 1.7–7)
NEUTROPHILS NFR BLD AUTO: 55 % (ref 42.7–76)
NRBC BLD AUTO-RTO: 0 /100 WBC (ref 0–0.2)
PLATELET # BLD AUTO: 300 10*3/MM3 (ref 140–450)
PMV BLD AUTO: 12.3 FL (ref 6–12)
POTASSIUM BLD-SCNC: 4.9 MMOL/L (ref 3.5–5.2)
PROT SERPL-MCNC: 7.9 G/DL (ref 6–8.5)
RBC # BLD AUTO: 4.77 10*6/MM3 (ref 4.14–5.8)
SODIUM BLD-SCNC: 138 MMOL/L (ref 136–145)
TRIGL SERPL-MCNC: 74 MG/DL (ref 0–150)
TSH SERPL DL<=0.05 MIU/L-ACNC: 2.12 MIU/ML (ref 0.27–4.2)
VLDLC SERPL-MCNC: 14.8 MG/DL (ref 5–40)
WBC NRBC COR # BLD: 10.5 10*3/MM3 (ref 3.4–10.8)

## 2019-05-07 PROCEDURE — 80053 COMPREHEN METABOLIC PANEL: CPT | Performed by: PHYSICIAN ASSISTANT

## 2019-05-07 PROCEDURE — G0439 PPPS, SUBSEQ VISIT: HCPCS | Performed by: PHYSICIAN ASSISTANT

## 2019-05-07 PROCEDURE — 83036 HEMOGLOBIN GLYCOSYLATED A1C: CPT | Performed by: PHYSICIAN ASSISTANT

## 2019-05-07 PROCEDURE — 85025 COMPLETE CBC W/AUTO DIFF WBC: CPT | Performed by: PHYSICIAN ASSISTANT

## 2019-05-07 PROCEDURE — 80061 LIPID PANEL: CPT | Performed by: PHYSICIAN ASSISTANT

## 2019-05-07 PROCEDURE — G0103 PSA SCREENING: HCPCS | Performed by: PHYSICIAN ASSISTANT

## 2019-05-07 PROCEDURE — 84443 ASSAY THYROID STIM HORMONE: CPT | Performed by: PHYSICIAN ASSISTANT

## 2019-05-07 RX ORDER — NITROGLYCERIN 0.4 MG/1
0.4 TABLET SUBLINGUAL
Qty: 30 TABLET | Refills: 1 | Status: SHIPPED | OUTPATIENT
Start: 2019-05-07

## 2019-05-07 RX ORDER — ROSUVASTATIN CALCIUM 40 MG/1
40 TABLET, COATED ORAL DAILY
Qty: 90 TABLET | Refills: 1 | Status: SHIPPED | OUTPATIENT
Start: 2019-05-07 | End: 2019-09-30 | Stop reason: SDUPTHER

## 2019-05-07 RX ORDER — CILOSTAZOL 100 MG/1
100 TABLET ORAL 2 TIMES DAILY
Qty: 180 TABLET | Refills: 1 | Status: CANCELLED | OUTPATIENT
Start: 2019-05-07

## 2019-05-07 RX ORDER — NICOTINE 21 MG/24HR
1 PATCH, TRANSDERMAL 24 HOURS TRANSDERMAL EVERY 24 HOURS
Qty: 28 PATCH | Refills: 2 | Status: SHIPPED | OUTPATIENT
Start: 2019-05-07 | End: 2019-07-18

## 2019-05-07 RX ORDER — GEMFIBROZIL 600 MG/1
600 TABLET, FILM COATED ORAL 2 TIMES DAILY
Qty: 180 TABLET | Refills: 1 | Status: SHIPPED | OUTPATIENT
Start: 2019-05-07 | End: 2019-05-08

## 2019-05-07 RX ORDER — RAMIPRIL 2.5 MG/1
2.5 CAPSULE ORAL DAILY
Qty: 90 CAPSULE | Refills: 1 | Status: SHIPPED | OUTPATIENT
Start: 2019-05-07 | End: 2019-09-30 | Stop reason: SDUPTHER

## 2019-05-07 RX ORDER — CLOPIDOGREL BISULFATE 75 MG/1
75 TABLET ORAL DAILY
Qty: 90 TABLET | Refills: 1 | Status: SHIPPED | OUTPATIENT
Start: 2019-05-07 | End: 2019-09-30 | Stop reason: SDUPTHER

## 2019-05-07 NOTE — PROGRESS NOTES
QUICK REFERENCE INFORMATION:  The ABCs of the Annual Wellness Visit    Medicare Subsequent Wellness Visit    Chief Complaint   Patient presents with   • Medicare Wellness-subsequent     pt would like [facility]ills on all medications [facility]        HPI     Ceasar Woodard is a 74 y.o. male presenting for Medicare Wellness-subsequent (pt would like refills on all medications for [facility])  .     Patient is a pleasant 75 y/o white male who presents to the office for routine annual subsequent wellness exam.  His past medical history is significant for hypertension, pacemaker, CAD, hyperlipidemia, tobacco dependence, COPD, PVD, squamous cell carcinoma and history of CVA.  Patient is followed by Dr. Lopez at [facility].  He recently had a pacemaker placed in 2016.  He is compliant on ASA 81 mg QD, plavix 75 mg QD, pletal 100 mg BID, metoprolol 25 mg QD, ramipril 2.5 mg QD, crestor 40 mg QD and gemfibrozil 600 mg QD.  Patient continues to smoke 1.5 packs per day, for the last 63 years.  He has nicotine patches but is not using them, aware not to smoke when he decides to use them.  He has failed chantix and is not interested in wellbutrin.  He has COPD and is using spiriva and albuterol as needed.  He reports difficulty with activity secondary to PVD, not shortness of breath although he does have that.  Had CT chest last year and it did not show any masses or nodules.  He has not had a colonoscopy in 13 years.  History of squamous cell carcinoma, hasn't been back to dermatology in about 2 years.  Goes to [place] for 6 months out of the year.  Was seen by ophthalmologist a year ago.  Has dentures, doesn't go to dentist.  Has been seen by audiology, has hearing aids but won't wear them.  Wife is present with patient.      Past Medical History:   Diagnosis Date   • Hypertension       Past Surgical History:   Procedure Laterality Date   • CORONARY ARTERY BYPASS GRAFT        Family History   Problem Relation Age of Onset   • COPD Mother    • Alzheimer's disease Mother    • Stroke Father    • Heart attack Father    • COPD Father       Social History     Socioeconomic History   • Marital status:      Spouse name: Not on file   • Number of children: Not on file   • Years of education: Not on file   • Highest education level: Not on file   Tobacco Use   • Smoking status: Current Every Day Smoker   • Smokeless tobacco: Never Used   Substance and Sexual Activity   • Alcohol use: Yes   • Drug use: No   • Sexual activity: Defer      No Known Allergies   Outpatient Medications Prior to Visit   Medication Sig Dispense Refill   • aspirin 81 MG tablet Take 81 mg by mouth Daily.     • Tiotropium Bromide Monohydrate 2.5 MCG/ACT aerosol solution Inhale 2 each Daily. 1 inhaler 2   • VENTOLIN  (90 Base) MCG/ACT inhaler inhale 2 puffs by mouth every 6 hours  0   • cilostazol (PLETAL) 100 MG tablet Take 1 tablet by mouth 2 (Two) Times a Day. 180 tablet 0   • clopidogrel (PLAVIX) 75 MG tablet Take 1 tablet by mouth Daily. 90 tablet 0   • gemfibrozil (LOPID) 600 MG tablet TAKE 1 TABLET TWICE A  tablet 1   • metoprolol tartrate (LOPRESSOR) 25 MG tablet TAKE 1 TABLET DAILY 90 tablet 1   • nicotine (EQL NICOTINE) 21 MG/24HR patch Place 1 patch on the skin Daily. 28 patch 2   • nitroglycerin (NITROSTAT) 0.4 MG SL tablet Place 1 tablet under the tongue Every 5 (Five) Minutes As Needed for Chest Pain. Take no more than 3 doses in 15 minutes. 30 tablet 1   • ramipril (ALTACE) 2.5 MG capsule TAKE 1 CAPSULE DAILY 90 capsule 1   • rosuvastatin (CRESTOR) 40 MG tablet TAKE 1 TABLET DAILY 90 tablet 0   • predniSONE (DELTASONE) 20 MG tablet Take 3 tablets (60 mg) x3 days, then 2 tablets (40 mg) x3 days, then 1 tablet (20 mg) x3 days, then 0.5 tablet (10 mg) x 4 days 20 tablet 0   • sulfamethoxazole-trimethoprim (BACTRIM DS) 800-160 MG per tablet Take 1 tablet by mouth 2 (Two) Times a Day. 20  "tablet 0     No facility-administered medications prior to visit.        Reviewed use of high risk medication in the elderly: yes  Reviewed for potential of harmful drug interactions in the elderly: yes    The following portions of the patient's history were reviewed and updated as appropriate: allergies, current medications, past family history, past medical history, past social history, past surgical history and problem list.    Review of Systems   Constitutional: Positive for fatigue. Negative for chills, diaphoresis and fever.   HENT: Positive for hearing loss. Negative for congestion, ear pain, postnasal drip, rhinorrhea, sinus pressure, sneezing and sore throat.    Eyes: Negative for pain and itching.   Respiratory: Positive for cough, shortness of breath and wheezing.    Cardiovascular: Negative for chest pain, palpitations and leg swelling.   Gastrointestinal: Positive for diarrhea. Negative for abdominal pain, blood in stool, constipation, nausea, vomiting and indigestion.   Endocrine: Negative for polyuria.   Genitourinary: Negative for flank pain and hematuria.   Musculoskeletal: Positive for myalgias. Negative for arthralgias, back pain and gait problem.   Skin: Positive for skin lesions. Negative for rash.   Neurological: Negative for dizziness, headache and memory problem.   Psychiatric/Behavioral: Negative for sleep disturbance, suicidal ideas, depressed mood and stress. The patient is not nervous/anxious.         Vitals:    05/07/19 1315   BP: 114/74   BP Location: Right arm   Patient Position: Sitting   Cuff Size: Adult   Pulse: 61   SpO2: 96%   Weight: 78.6 kg (173 lb 3.2 oz)   Height: 177.8 cm (70\")       Objective    Physical Exam   Constitutional: He is oriented to person, place, and time. Vital signs are normal. He appears well-developed and well-nourished. He is active and cooperative. He has a sickly appearance. No distress. He is not overweight.  HENT:   Head: Normocephalic and atraumatic. "   Right Ear: Hearing, tympanic membrane, external ear and ear canal normal.   Left Ear: Hearing, tympanic membrane, external ear and ear canal normal.   Nose: Nose normal. Right sinus exhibits no maxillary sinus tenderness and no frontal sinus tenderness. Left sinus exhibits no maxillary sinus tenderness and no frontal sinus tenderness.   Mouth/Throat: Uvula is midline, oropharynx is clear and moist and mucous membranes are normal.   Eyes: Conjunctivae, EOM and lids are normal.   Neck: Trachea normal, normal range of motion and phonation normal. No thyroid mass and no thyromegaly present.   Cardiovascular: Normal rate, regular rhythm and normal heart sounds.   Pulmonary/Chest: Effort normal and breath sounds normal.   Abdominal: Soft. Normal appearance. He exhibits no distension. There is no tenderness. There is no rigidity, no guarding and no CVA tenderness.   Musculoskeletal: Normal range of motion. He exhibits no edema.   Lymphadenopathy:     He has no cervical adenopathy.        Right cervical: No superficial cervical adenopathy present.       Left cervical: No superficial cervical adenopathy present.   Neurological: He is alert and oriented to person, place, and time. He has normal strength and normal reflexes. Coordination and gait normal.   CN grossly intact   Skin: Skin is warm and intact. No rash noted. He is not diaphoretic. No cyanosis or erythema. Nails show no clubbing.   Psychiatric: He has a normal mood and affect. His speech is normal and behavior is normal. Judgment and thought content normal. He is not actively hallucinating. Cognition and memory are normal. He is attentive.   Vitals reviewed.       HEALTH RISK ASSESSMENT    1945    Recent Hospitalizations:  No hospitalization(s) within the last year..      Current Medical Providers:  Patient Care Team:  Prema Frias PA-C as PCP - General (Physician Assistant)  Jamila Deleon MD as PCP - Claims Attributed  Leif Cabello MD  (Interventional Cardiology)      Smoking Status:  Social History     Tobacco Use   Smoking Status Current Every Day Smoker   Smokeless Tobacco Never Used       Alcohol Consumption:  Social History     Substance and Sexual Activity   Alcohol Use Yes       Depression Screen:   PHQ-2/PHQ-9 Depression Screening 5/8/2019   Little interest or pleasure in doing things 0   Feeling down, depressed, or hopeless 0   Total Score 0       Health Habits and Functional and Cognitive Screening:  Functional & Cognitive Status 5/7/2019   Do you have difficulty preparing food and eating? No   Do you have difficulty bathing yourself, getting dressed or grooming yourself? No   Do you have difficulty using the toilet? No   Do you have difficulty moving around from place to place? No   Do you have trouble with steps or getting out of a bed or a chair? No   In the past year have you fallen or experienced a near fall? Yes   Current Diet Well Balanced Diet   Dental Exam Not up to date   Eye Exam Not up to date   Exercise (times per week) 0 times per week   Current Exercise Activities Include None   Do you need help using the phone?  No   Are you deaf or do you have serious difficulty hearing?  Yes   Do you need help with transportation? No   Do you need help shopping? No   Do you need help preparing meals?  No   Do you need help with housework?  No   Do you need help with laundry? No   Do you need help taking your medications? No   Do you need help managing money? No   Do you ever drive or ride in a car without wearing a seat belt? No   Have you felt unusual stress, anger or loneliness in the last month? No   Who do you live with? Spouse   If you need help, do you have trouble finding someone available to you? No   Have you been bothered in the last four weeks by sexual problems? No   Do you have difficulty concentrating, remembering or making decisions? Yes           Does the patient have evidence of cognitive impairment? Yes    Aspirin use  counseling? Taking ASA appropriately as indicated      Recent Lab Results:  CMP:  Lab Results   Component Value Date    BUN 12 05/07/2019    CREATININE 1.00 05/07/2019    EGFRIFNONA 73 05/07/2019    BCR 12.0 05/07/2019     05/07/2019    K 4.9 05/07/2019    CO2 21.0 (L) 05/07/2019    CALCIUM 9.6 05/07/2019    ALBUMIN 4.50 05/07/2019    BILITOT 0.3 05/07/2019    ALKPHOS 105 05/07/2019    AST 21 05/07/2019    ALT 11 05/07/2019     Lipid Panel:  Lab Results   Component Value Date    CHOL 148 05/07/2019    TRIG 74 05/07/2019    HDL 54 05/07/2019    VLDL 14.8 05/07/2019    LDLHDL 1.47 05/07/2019     HbA1c:  Lab Results   Component Value Date    HGBA1C 6.28 (H) 05/07/2019       Visual Acuity:  No exam data present    Age-appropriate Screening Schedule:  Refer to the list below for future screening recommendations based on patient's age, sex and/or medical conditions. Orders for these recommended tests are listed in the plan section. The patient has been provided with a written plan.    Health Maintenance   Topic Date Due   • ZOSTER VACCINE (1 of 2) 03/23/1994   • PNEUMOCOCCAL VACCINES (65+ LOW/MEDIUM RISK) (1 of 2 - PCV13) 03/23/2009   • COLONOSCOPY  01/18/2018   • INFLUENZA VACCINE  08/01/2019   • LIPID PANEL  05/07/2020   • TDAP/TD VACCINES (2 - Td) 01/20/2027          Advance Care Planning:  Patient does not have an advance directive - information provided to the patient today    Identification of Risk Factors:  Risk factors include: weight , inactivity and increased fall risk.    Compared to one year ago, the patient feels his physical health is the same.  Compared to one year ago, the patient feels his mental health is the same.      Ceasar was seen today for medicare wellness-subsequent.    Diagnoses and all orders for this visit:    Medicare annual wellness visit, subsequent  -     CBC Auto Differential  -     TSH  -     Hemoglobin A1c    PVD (peripheral vascular disease) (CMS/[facility])  -this is patient's main  complaint, claudication with walking  -he is on pletal 100 mg BID but is also on plavix and ASA  -discussed stopping the pletal and seeing how patient does without it    Pulmonary emphysema, unspecified emphysema type (CMS/HCC)  -on spiriva daily and albuterol prn  -still smoking 1.5 packs a day  -will trial trelegy instead of spiriva and see how patient does, gave samples    Hypertension, unspecified type  -stable, well-controlled  -compliant on medications  -     metoprolol tartrate (LOPRESSOR) 25 MG tablet; Take 1 tablet by mouth Daily.  -     ramipril (ALTACE) 2.5 MG capsule; Take 1 capsule by mouth Daily.  -     Comprehensive Metabolic Panel    Mixed hyperlipidemia  -followed by cardiology  -tolerating fibrate and statin for years  -     gemfibrozil (LOPID) 600 MG tablet; Take 1 tablet by mouth 2 (Two) Times a Day.  -     rosuvastatin (CRESTOR) 40 MG tablet; Take 1 tablet by mouth Daily.  -     Lipid Panel    Complete heart block (CMS/HCC)  -had pacemaker placed in 2016    Coronary artery disease involving native coronary artery of native heart without angina pectoris  -denies any chest pain or discomfort today  -followed by cardiology  -     nitroglycerin (NITROSTAT) 0.4 MG SL tablet; Place 1 tablet under the tongue Every 5 (Five) Minutes As Needed for Chest Pain. Take no more than 3 doses in 15 minutes.    Cerebrovascular accident (CVA) due to thrombosis of carotid artery, unspecified blood vessel laterality (CMS/HCC)  -compliant on plavix and ASA  -     clopidogrel (PLAVIX) 75 MG tablet; Take 1 tablet by mouth Daily.    Squamous cell carcinoma of head and neck (CMS/HCC)  -had cancerous lesions removed about two years ago, has not been back to dermatology and does not want to go back  -discussed importance of this and patient is aware    Tobacco dependence  -patient is smoking 1.5 packs a day x 63 years  -he tried chantix and failed, not interested in wellbutrin  -has patches but is not using them, discussed  importance of avoiding smoking with patches once he starts  -     nicotine (EQL NICOTINE) 21 MG/24HR patch; Place 1 patch on the skin as directed by provider Daily.    Cigarette smoker  -pending records from cardiology, may need AAA screening  -     CT Chest Low Dose Wo; Future    Screen for colon cancer  -     Ambulatory Referral For Screening Colonoscopy    Special screening, prostate cancer  -     PSA Screen        Procedure   Procedures       Patient Self-Management and Personalized Health Advice  The patient has been provided with information about: diet, exercise, tobacco cessation, fall prevention and supplements and preventive services including:   · Advance directive, Colorectal cancer screening, colonoscopy referral placed, Diabetes screening, see lab orders, Fall Risk assessment done, Prostate cancer screening discussed.      Follow Up:  Return in about 6 months (around 11/7/2019) for Recheck.     An After Visit Summary and PPPS with all of these plans were given to the patient.

## 2019-05-08 PROBLEM — Z95.0 PACEMAKER: Status: ACTIVE | Noted: 2019-05-08

## 2019-05-08 LAB
HBA1C MFR BLD: 6.28 % (ref 4.8–5.6)
PSA SERPL-MCNC: 1.56 NG/ML (ref 0–4)

## 2019-05-10 NOTE — PROGRESS NOTES
I have reviewed the notes, assessments, and/or procedures performed by KOKO Lui, I concur with her/his documentation of Ceasar Woodard.

## 2019-05-13 ENCOUNTER — HOSPITAL ENCOUNTER (OUTPATIENT)
Dept: CT IMAGING | Facility: HOSPITAL | Age: 74
Discharge: HOME | End: 2019-05-13
Admitting: PHYSICIAN ASSISTANT

## 2019-05-13 DIAGNOSIS — F17.210 CIGARETTE SMOKER: ICD-10-CM

## 2019-05-13 PROCEDURE — G0297 LDCT FOR LUNG CA SCREEN: HCPCS

## 2019-05-15 DIAGNOSIS — I25.10 CORONARY ARTERY DISEASE INVOLVING NATIVE CORONARY ARTERY OF NATIVE HEART WITHOUT ANGINA PECTORIS: ICD-10-CM

## 2019-05-15 DIAGNOSIS — K21.9 GASTROESOPHAGEAL REFLUX DISEASE WITHOUT ESOPHAGITIS: Primary | ICD-10-CM

## 2019-05-15 DIAGNOSIS — Z87.891 PERSONAL HISTORY OF TOBACCO USE, PRESENTING HAZARDS TO HEALTH: ICD-10-CM

## 2019-05-15 DIAGNOSIS — F17.210 CIGARETTE SMOKER: ICD-10-CM

## 2019-05-15 DIAGNOSIS — I10 HYPERTENSION, UNSPECIFIED TYPE: Primary | ICD-10-CM

## 2019-05-15 RX ORDER — PANTOPRAZOLE SODIUM 40 MG/1
40 TABLET, DELAYED RELEASE ORAL DAILY
Qty: 90 TABLET | Refills: 3 | Status: SHIPPED | OUTPATIENT
Start: 2019-05-15 | End: 2020-01-01 | Stop reason: SDUPTHER

## 2019-05-21 ENCOUNTER — PREP FOR SURGERY (OUTPATIENT)
Dept: OTHER | Facility: HOSPITAL | Age: 74
End: 2019-05-21

## 2019-05-21 DIAGNOSIS — Z12.11 SPECIAL SCREENING FOR MALIGNANT NEOPLASMS, COLON: Primary | ICD-10-CM

## 2019-05-21 RX ORDER — DEXTROSE, SODIUM CHLORIDE, SODIUM LACTATE, POTASSIUM CHLORIDE, AND CALCIUM CHLORIDE 5; .6; .31; .03; .02 G/100ML; G/100ML; G/100ML; G/100ML; G/100ML
100 INJECTION, SOLUTION INTRAVENOUS CONTINUOUS
Status: CANCELLED | OUTPATIENT
Start: 2019-05-21

## 2019-05-21 RX ORDER — SODIUM CHLORIDE 0.9 % (FLUSH) 0.9 %
3 SYRINGE (ML) INJECTION EVERY 12 HOURS SCHEDULED
Status: CANCELLED | OUTPATIENT
Start: 2019-05-21

## 2019-05-21 RX ORDER — SODIUM CHLORIDE 0.9 % (FLUSH) 0.9 %
3-10 SYRINGE (ML) INJECTION AS NEEDED
Status: CANCELLED | OUTPATIENT
Start: 2019-05-21

## 2019-05-22 PROBLEM — Z12.11 SPECIAL SCREENING FOR MALIGNANT NEOPLASMS, COLON: Status: ACTIVE | Noted: 2019-05-22

## 2019-05-28 ENCOUNTER — TELEPHONE (OUTPATIENT)
Dept: FAMILY MEDICINE CLINIC | Facility: CLINIC | Age: 74
End: 2019-05-28

## 2019-05-28 ENCOUNTER — HOSPITAL ENCOUNTER (OUTPATIENT)
Dept: ULTRASOUND IMAGING | Facility: HOSPITAL | Age: 74
Discharge: HOME | End: 2019-05-28
Admitting: PHYSICIAN ASSISTANT

## 2019-05-28 DIAGNOSIS — I71.40 ABDOMINAL AORTIC ANEURYSM (AAA) WITHOUT RUPTURE (HCC): Primary | ICD-10-CM

## 2019-05-28 DIAGNOSIS — I10 HYPERTENSION, UNSPECIFIED TYPE: ICD-10-CM

## 2019-05-28 DIAGNOSIS — Z87.891 PERSONAL HISTORY OF TOBACCO USE, PRESENTING HAZARDS TO HEALTH: ICD-10-CM

## 2019-05-28 DIAGNOSIS — I25.10 CORONARY ARTERY DISEASE INVOLVING NATIVE CORONARY ARTERY OF NATIVE HEART WITHOUT ANGINA PECTORIS: ICD-10-CM

## 2019-05-28 DIAGNOSIS — F17.210 CIGARETTE SMOKER: ICD-10-CM

## 2019-05-28 PROCEDURE — 76706 US ABDL AORTA SCREEN AAA: CPT

## 2019-05-28 NOTE — TELEPHONE ENCOUNTER
PER PT'S WIFE CALLED, WOULD LIKE SOMEONE TO CALL BACK REGARDING PT'S US. REQUESTING A RETURN CALL TODAY. PLEASE CALL BACK.

## 2019-05-28 NOTE — TELEPHONE ENCOUNTER
Contacted PT and advised of the recent results. He has a office visit with cardiology tommorw and would like his records forwarded there. He would also like an order placed for vascular surgeon

## 2019-05-28 NOTE — TELEPHONE ENCOUNTER
Elena Contreras 59 minutes ago (11:12 AM)         PER PT'S WIFE CALLED, WOULD LIKE SOMEONE TO CALL BACK REGARDING PT'S US. REQUESTING A RETURN CALL TODAY. PLEASE CALL BACK.

## 2019-05-28 NOTE — TELEPHONE ENCOUNTER
Please inform patient there is a significant enlargement of his aorta.  I recommend that he sees a vascular surgeon.  Has he seen a vascular surgeon before?  Would he like us to forward the results to his cardiologist?

## 2019-05-29 NOTE — TELEPHONE ENCOUNTER
Spoke with patient's wife.  Made a referral to vascular surgeon.  She will also discuss this with cardiologist tomorrow.

## 2019-06-03 ENCOUNTER — TELEPHONE (OUTPATIENT)
Dept: FAMILY MEDICINE CLINIC | Facility: CLINIC | Age: 74
End: 2019-06-03

## 2019-06-03 NOTE — TELEPHONE ENCOUNTER
Spoke with wife.  He has appointment on 06/12 for aortic repair by Dr. Paresh Nichols at [facility].

## 2019-06-10 ENCOUNTER — TELEPHONE (OUTPATIENT)
Dept: FAMILY MEDICINE CLINIC | Facility: CLINIC | Age: 74
End: 2019-06-10

## 2019-06-10 DIAGNOSIS — J43.9 PULMONARY EMPHYSEMA, UNSPECIFIED EMPHYSEMA TYPE (HCC): Primary | ICD-10-CM

## 2019-06-10 NOTE — TELEPHONE ENCOUNTER
Please notify patient that I sent in trelegy refill.  [facility].  He can discontinue the triotroprium bromide as this is duplication in therapy - the trelegy is a combination of 3 medications and this is one of them.  The ventolin can be used on an add needed basis, when he is experiencing shortness of breath worse than normal.

## 2019-06-10 NOTE — TELEPHONE ENCOUNTER
Was given a sample of Trelegy which helps more than other inhalers he has used in the past.     He is requesting a script sent to [facility] [address]     Also, wants to know if he is to continue Tiotropium Bromide Monohydrate and Ventolin inhaler?

## 2019-06-10 NOTE — TELEPHONE ENCOUNTER
Spoke with patients wife [email or web address] reviewed. Advised that the prescription for trelegy has been sent in and for the patient to d/c triotroprium bromide. Further advised that he can still use the ventolin as needed.   Pt wife verbalized understanding and was appreciative for the call.

## 2019-06-12 ENCOUNTER — APPOINTMENT (OUTPATIENT)
Dept: PREADMISSION TESTING | Facility: HOSPITAL | Age: 74
End: 2019-06-12

## 2019-06-12 ENCOUNTER — PRIOR AUTHORIZATION (OUTPATIENT)
Dept: FAMILY MEDICINE CLINIC | Facility: CLINIC | Age: 74
End: 2019-06-12

## 2019-07-18 ENCOUNTER — OFFICE VISIT (OUTPATIENT)
Dept: FAMILY MEDICINE CLINIC | Facility: CLINIC | Age: 74
End: 2019-07-18

## 2019-07-18 VITALS
BODY MASS INDEX: 25.62 KG/M2 | HEART RATE: 60 BPM | WEIGHT: 179 LBS | DIASTOLIC BLOOD PRESSURE: 70 MMHG | SYSTOLIC BLOOD PRESSURE: 100 MMHG | HEIGHT: 70 IN | TEMPERATURE: 98 F

## 2019-07-18 DIAGNOSIS — J43.9 PULMONARY EMPHYSEMA, UNSPECIFIED EMPHYSEMA TYPE (HCC): ICD-10-CM

## 2019-07-18 DIAGNOSIS — R91.1 INCIDENTAL LUNG NODULE, > 3MM AND < 8MM: Primary | ICD-10-CM

## 2019-07-18 DIAGNOSIS — F17.200 TOBACCO DEPENDENCE: ICD-10-CM

## 2019-07-18 PROCEDURE — 99214 OFFICE O/P EST MOD 30 MIN: CPT | Performed by: PHYSICIAN ASSISTANT

## 2019-07-18 NOTE — PROGRESS NOTES
Chief Complaint   Patient presents with   • Follow-up     He comes in today to biscuss his test results.       HPI      Ceasar Woodard is a 74 y.o. male who presents for Follow-up (He comes in today to biscuss his test results.)    Patient recently underwent aortic aneurysm repair.  During scans for this procedure, an incidental lung nodule of 4mm was discovered in his lower lung base.  Recent CT chest low dose did not appreciate any such findings.  He has long history of smoking 2 packs a day, reports he has recently decreased intake to 1 pack a day.  Failed chantix due to changes in mood/thinking.  He has not tried patch and wellbutrin, will consider this.  Not established with pulmonology.  Using inhalers as prescribed.  Denies worsening cough or shortness of breath today, at baseline.  Wife is present.    Past Medical History:   Diagnosis Date   • Hypertension        Past Surgical History:   Procedure Laterality Date   • ABDOMINAL SURGERY  06/12/2019   • CORONARY ARTERY BYPASS GRAFT         Family History   Problem Relation Age of Onset   • COPD Mother    • Alzheimer's disease Mother    • Stroke Father    • Heart attack Father    • COPD Father        Social History     Socioeconomic History   • Marital status:      Spouse name: Not on file   • Number of children: Not on file   • Years of education: Not on file   • Highest education level: Not on file   Tobacco Use   • Smoking status: Current Every Day Smoker   • Smokeless tobacco: Never Used   Substance and Sexual Activity   • Alcohol use: Yes   • Drug use: No   • Sexual activity: Defer       No Known Allergies    ROS    Review of Systems   Constitutional: Negative for chills, diaphoresis, fatigue and fever.   HENT: Negative for congestion, postnasal drip and rhinorrhea.    Respiratory: Positive for cough, shortness of breath and wheezing.         Cough, SOB and wheezing at baseline today, chronic issue due to smoking.   Cardiovascular: Negative for  "chest pain and leg swelling.   Neurological: Negative for dizziness and headache.       Vitals:    07/18/19 0955   BP: 100/70   Pulse: 60   Temp: 98 °F (36.7 °C)   Weight: 81.2 kg (179 lb)   Height: 177.8 cm (70\")       Current Outpatient Medications on File Prior to Visit   Medication Sig Dispense Refill   • aspirin 81 MG tablet Take 81 mg by mouth Daily.     • clopidogrel (PLAVIX) 75 MG tablet Take 1 tablet by mouth Daily. 90 tablet 1   • Fluticasone-Umeclidin-Vilant (TRELEGY ELLIPTA) 100-62.5-25 MCG/INH aerosol powder  Take 1 each by mouth Daily. 28 each 5   • metoprolol tartrate (LOPRESSOR) 25 MG tablet Take 1 tablet by mouth Daily. 90 tablet 1   • nitroglycerin (NITROSTAT) 0.4 MG SL tablet Place 1 tablet under the tongue Every 5 (Five) Minutes As Needed for Chest Pain. Take no more than 3 doses in 15 minutes. 30 tablet 1   • pantoprazole (PROTONIX) 40 MG EC tablet Take 1 tablet by mouth Daily. 90 tablet 3   • ramipril (ALTACE) 2.5 MG capsule Take 1 capsule by mouth Daily. 90 capsule 1   • rosuvastatin (CRESTOR) 40 MG tablet Take 1 tablet by mouth Daily. 90 tablet 1   • VENTOLIN  (90 Base) MCG/ACT inhaler inhale 2 puffs by mouth every 6 hours  0   • [DISCONTINUED] nicotine (EQL NICOTINE) 21 MG/24HR patch Place 1 patch on the skin as directed by provider Daily. 28 patch 2     No current facility-administered medications on file prior to visit.        Results for orders placed or performed in visit on 05/07/19   CBC Auto Differential   Result Value Ref Range    WBC 10.50 3.40 - 10.80 10*3/mm3    RBC 4.77 4.14 - 5.80 10*6/mm3    Hemoglobin 15.1 13.0 - 17.7 g/dL    Hematocrit 48.3 37.5 - 51.0 %    .3 (H) 79.0 - 97.0 fL    MCH 31.7 26.6 - 33.0 pg    MCHC 31.3 (L) 31.5 - 35.7 g/dL    RDW 14.9 12.3 - 15.4 %    RDW-SD 56.6 (H) 37.0 - 54.0 fl    MPV 12.3 (H) 6.0 - 12.0 fL    Platelets 300 140 - 450 10*3/mm3    Neutrophil % 55.0 42.7 - 76.0 %    Lymphocyte % 33.9 19.6 - 45.3 %    Monocyte % 6.6 5.0 - 12.0 % "    Eosinophil % 2.8 0.3 - 6.2 %    Basophil % 0.9 0.0 - 1.5 %    Immature Grans % 0.8 (H) 0.0 - 0.5 %    Neutrophils, Absolute 5.79 1.70 - 7.00 10*3/mm3    Lymphocytes, Absolute 3.56 (H) 0.70 - 3.10 10*3/mm3    Monocytes, Absolute 0.69 0.10 - 0.90 10*3/mm3    Eosinophils, Absolute 0.29 0.00 - 0.40 10*3/mm3    Basophils, Absolute 0.09 0.00 - 0.20 10*3/mm3    Immature Grans, Absolute 0.08 (H) 0.00 - 0.05 10*3/mm3    nRBC 0.0 0.0 - 0.2 /100 WBC   Comprehensive Metabolic Panel   Result Value Ref Range    Glucose 93 65 - 99 mg/dL    BUN 12 8 - 23 mg/dL    Creatinine 1.00 0.76 - 1.27 mg/dL    Sodium 138 136 - 145 mmol/L    Potassium 4.9 3.5 - 5.2 mmol/L    Chloride 106 98 - 107 mmol/L    CO2 21.0 (L) 22.0 - 29.0 mmol/L    Calcium 9.6 8.6 - 10.5 mg/dL    Total Protein 7.9 6.0 - 8.5 g/dL    Albumin 4.50 3.50 - 5.20 g/dL    ALT (SGPT) 11 1 - 41 U/L    AST (SGOT) 21 1 - 40 U/L    Alkaline Phosphatase 105 39 - 117 U/L    Total Bilirubin 0.3 0.2 - 1.2 mg/dL    eGFR Non African Amer 73 >60 mL/min/1.73    Globulin 3.4 gm/dL    A/G Ratio 1.3 g/dL    BUN/Creatinine Ratio 12.0 7.0 - 25.0    Anion Gap 11.0 mmol/L   TSH   Result Value Ref Range    TSH 2.120 0.270 - 4.200 mIU/mL   Hemoglobin A1c   Result Value Ref Range    Hemoglobin A1C 6.28 (H) 4.80 - 5.60 %   Lipid Panel   Result Value Ref Range    Total Cholesterol 148 0 - 200 mg/dL    Triglycerides 74 0 - 150 mg/dL    HDL Cholesterol 54 40 - 60 mg/dL    LDL Cholesterol  79 0 - 100 mg/dL    VLDL Cholesterol 14.8 5 - 40 mg/dL    LDL/HDL Ratio 1.47    PSA Screen   Result Value Ref Range    PSA 1.560 0.000 - 4.000 ng/mL       PE    Physical Exam   Constitutional: Vital signs are normal. He appears well-developed and well-nourished. He is active and cooperative. He does not appear ill. No distress. He is not overweight.  HENT:   Head: Normocephalic and atraumatic.   Eyes: EOM are normal.   Neck: Normal range of motion.   Cardiovascular: Normal rate, regular rhythm and normal heart  sounds.   Pulmonary/Chest: Effort normal. He has decreased breath sounds. He has wheezes. He has no rhonchi. He has rales.   Musculoskeletal: Normal range of motion. He exhibits no edema.   Neurological: He is alert.   Skin: Skin is warm. He is not diaphoretic. No erythema.   Psychiatric: He has a normal mood and affect. His speech is normal and behavior is normal. Judgment and thought content normal. He is not actively hallucinating. He is attentive.        A/P    Ceasar was seen today for follow-up.    Diagnoses and all orders for this visit:    Incidental lung nodule, > 3mm and < 8mm  -4 mm lung nodule appreciated in lower lung base as incidental finding recently when patient underwent contrast imaging for AAA  -recent CT chest low dose did not show any findings in May  -will refer to pulmonology given new finding and complicated patient with ongoing tobacco abuse, COPD and high risk co-morbidities  -     Ambulatory Referral to Pulmonology    Pulmonary emphysema, unspecified emphysema type (CMS/HCC)  -     Ambulatory Referral to Pulmonology    Tobacco dependence  -     Ambulatory Referral to Pulmonology         Plan of care reviewed with patient at the conclusion of today's visit. Education was provided regarding diagnosis, management and any prescribed or recommended OTC medications.  Patient verbalizes understanding of and agreement with management plan.    No Follow-up on file.     Prema Frias PA-C

## 2019-07-18 NOTE — PATIENT INSTRUCTIONS
-hepatitis A vaccine and shingrix, ask pharmacist    -think about wellbutrin and nicotine patch combination    -stop immodium, call if diarrhea is an issue

## 2019-08-22 ENCOUNTER — OFFICE VISIT (OUTPATIENT)
Dept: PULMONOLOGY | Facility: CLINIC | Age: 74
End: 2019-08-22

## 2019-08-22 VITALS
HEART RATE: 65 BPM | BODY MASS INDEX: 27.02 KG/M2 | OXYGEN SATURATION: 95 % | SYSTOLIC BLOOD PRESSURE: 134 MMHG | WEIGHT: 182.4 LBS | DIASTOLIC BLOOD PRESSURE: 82 MMHG | TEMPERATURE: 98.6 F | HEIGHT: 69 IN

## 2019-08-22 DIAGNOSIS — R93.89 ABNORMAL CT OF THE CHEST: Primary | ICD-10-CM

## 2019-08-22 DIAGNOSIS — J44.9 CHRONIC OBSTRUCTIVE PULMONARY DISEASE, UNSPECIFIED COPD TYPE (HCC): ICD-10-CM

## 2019-08-22 DIAGNOSIS — F17.200 TOBACCO DEPENDENCE: ICD-10-CM

## 2019-08-22 PROCEDURE — 94729 DIFFUSING CAPACITY: CPT | Performed by: NURSE PRACTITIONER

## 2019-08-22 PROCEDURE — 99203 OFFICE O/P NEW LOW 30 MIN: CPT | Performed by: NURSE PRACTITIONER

## 2019-08-22 PROCEDURE — 94060 EVALUATION OF WHEEZING: CPT | Performed by: NURSE PRACTITIONER

## 2019-08-22 PROCEDURE — 94726 PLETHYSMOGRAPHY LUNG VOLUMES: CPT | Performed by: NURSE PRACTITIONER

## 2019-08-22 RX ORDER — ALBUTEROL SULFATE 90 UG/1
4 AEROSOL, METERED RESPIRATORY (INHALATION) ONCE
Status: COMPLETED | OUTPATIENT
Start: 2019-08-22 | End: 2019-08-22

## 2019-08-22 RX ADMIN — ALBUTEROL SULFATE 4 PUFF: 90 AEROSOL, METERED RESPIRATORY (INHALATION) at 14:34

## 2019-08-22 NOTE — PROGRESS NOTES
[facility] Pulmonary Evaluation    CHIEF COMPLAINT    Lung nodule    Refered by:  Prema Frias,*        HISTORY OF PRESENT ILLNESS    Ceasar Woodard is a 74 y.o.male here today for evaluation of a lung nodule.  Initially there was a 4 mm nodule appreciated on the lower lung base incidentally during a evaluation for his AAA at [facility], on a CT scan of the abdomen done at [facility] may 30th.    Follow-up low dose screening CT scan on May 13th showed some minimal biapical pleural-parenchymal scarring with central bronchiectasis and some scarring and reticulation of the bilateral lower lobes without dominant mass or nodule.    In April 2018 he had another low-dose screening CT scan that showed some healed granulomatous disease with calcifications in the left hilar region.  Patchy ground less opacities in the lower lobes with bronchiectasis.    He does have history of COPD with ongoing tobacco abuse.  He has been on Trelegy, but does not take it consistently.    Patient Active Problem List   Diagnosis   • CAD (coronary artery disease)   • CVA (cerebral vascular accident) (CMS/HCC)   • Tobacco dependence   • Complete heart block (CMS/HCC)   • Hyperlipidemia   • Hypertension   • COPD (chronic obstructive pulmonary disease) (CMS/HCC)   • PVD (peripheral vascular disease) (CMS/HCC)   • Squamous cell carcinoma of head and neck (CMS/HCC)   • Coronary arteriosclerosis in native artery   • Pacemaker   • Special screening for malignant neoplasms, colon       No Known Allergies    Current Outpatient Medications:   •  aspirin 81 MG tablet, Take 81 mg by mouth Daily., Disp: , Rfl:   •  clopidogrel (PLAVIX) 75 MG tablet, Take 1 tablet by mouth Daily., Disp: 90 tablet, Rfl: 1  •  Fluticasone-Umeclidin-Vilant (TRELEGY ELLIPTA) 100-62.5-25 MCG/INH aerosol powder , Take 1 each by mouth Daily., Disp: 28 each, Rfl: 5  •  metoprolol tartrate (LOPRESSOR) 25 MG tablet, Take 1 tablet by mouth Daily., Disp: 90 tablet,  "Rfl: 1  •  nitroglycerin (NITROSTAT) 0.4 MG SL tablet, Place 1 tablet under the tongue Every 5 (Five) Minutes As Needed for Chest Pain. Take no more than 3 doses in 15 minutes., Disp: 30 tablet, Rfl: 1  •  pantoprazole (PROTONIX) 40 MG EC tablet, Take 1 tablet by mouth Daily., Disp: 90 tablet, Rfl: 3  •  ramipril (ALTACE) 2.5 MG capsule, Take 1 capsule by mouth Daily., Disp: 90 capsule, Rfl: 1  •  rosuvastatin (CRESTOR) 40 MG tablet, Take 1 tablet by mouth Daily., Disp: 90 tablet, Rfl: 1  •  VENTOLIN  (90 Base) MCG/ACT inhaler, inhale 2 puffs by mouth every 6 hours, Disp: , Rfl: 0  •  Cilostazol (PLETAL PO), Take  by mouth., Disp: , Rfl:   No current facility-administered medications for this visit.     MEDICATION LIST AND ALLERGIES REVIEWED.    Social History     Tobacco Use   • Smoking status: Current Every Day Smoker     Packs/day: 1.00     Years: 61.00     Pack years: 61.00     Types: Cigarettes   • Smokeless tobacco: Never Used   Substance Use Topics   • Alcohol use: Yes     Alcohol/week: 2.4 oz     Types: 4 Shots of liquor per week     Comment: bourbon   • Drug use: No       FAMILY AND SOCIAL HISTORY REVIEWED AND UPDATED IN EPIC    Review of Systems   Constitutional: Negative for chills, fatigue, fever and unexpected weight change.   HENT: Negative for congestion, nosebleeds, postnasal drip, rhinorrhea, sinus pressure and trouble swallowing.    Respiratory: Negative for cough, chest tightness, shortness of breath and wheezing.    Cardiovascular: Negative for chest pain and leg swelling.   Gastrointestinal: Negative for abdominal pain, constipation, diarrhea, nausea and vomiting.   Genitourinary: Negative for dysuria, frequency, hematuria and urgency.   Musculoskeletal: Negative for myalgias.   Neurological: Negative for dizziness, weakness, numbness and headaches.   All other systems reviewed and are negative.  .    /82   Pulse 65   Temp 98.6 °F (37 °C)   Ht 174 cm (68.5\")   Wt 82.7 kg (182 lb " 6.4 oz)   SpO2 95% Comment: resting, room air  BMI 27.33 kg/m²   Immunization History   Administered Date(s) Administered   • Flu Vaccine High Dose PF 65YR+ 10/19/2016   • Influenza, Unspecified 01/01/2019   • TD Preservative Free 01/20/2017   • Tdap 01/20/2017       Physical Exam   Constitutional: He is oriented to person, place, and time. He appears well-developed and well-nourished.   HENT:   Head: Normocephalic and atraumatic.   Eyes: EOM are normal. Pupils are equal, round, and reactive to light.   Neck: Normal range of motion. Neck supple.   Cardiovascular: Normal rate and regular rhythm.   No murmur heard.  Pulmonary/Chest: Effort normal and breath sounds normal. No respiratory distress. He has no wheezes. He has no rales.   Abdominal: Soft. Bowel sounds are normal. He exhibits no distension.   Musculoskeletal: Normal range of motion. He exhibits no edema.   Neurological: He is alert and oriented to person, place, and time.   Skin: Skin is warm and dry. No erythema.   Psychiatric: He has a normal mood and affect. His behavior is normal.   Vitals reviewed.      RESULTS    Lab Results   Component Value Date    WBC 10.50 05/07/2019    HGB 15.1 05/07/2019    HCT 48.3 05/07/2019    .3 (H) 05/07/2019     05/07/2019     Lab Results   Component Value Date    GLUCOSE 93 05/07/2019    CALCIUM 9.6 05/07/2019     05/07/2019    K 4.9 05/07/2019    CO2 21.0 (L) 05/07/2019     05/07/2019    BUN 12 05/07/2019    CREATININE 1.00 05/07/2019    EGFRIFNONA 73 05/07/2019    BCR 12.0 05/07/2019    ANIONGAP 11.0 05/07/2019       PFTs done in the office today, and read by me:  Moderate obstructive airway disease with no restriction.  FEV1 is 2.19, 76% predicted.  14% change postbronchodilator.  Evidence of air trapping with residual volume 130%.  Normal diffusion      PA/LAT CXR done in the office today, and reviewed by me:  Awaiting final MD review      EXAMINATION: CT CHEST LOW DOSE WO- 05/13/2019      INDICATION: Lung cancer annual screening, asymptomatic, current smoker  (min. 30 pack/ yr); F17.210-Nicotine dependence, cigarettes,  uncomplicated.      TECHNIQUE: CT low-dose chest without intravenous contrast.     The radiation dose reduction device was turned on for each scan per the  ALARA (As Low as Reasonably Achievable) protocol.     COMPARISON: CT dated 04/25/2018.     FINDINGS: Thyroid is homogeneous in attenuation. No bulky mediastinal  adenopathy. Central airways are patent. Esophagus indicates mild mucosal  thickening and small to moderate hiatal hernia at the distal segment.  Atherosclerotic nonaneurysmal thoracic aorta. Cardiac size within normal  limits without pericardial effusion. Extended lung windows demonstrate  minimal biapical pleural parenchymal scarring along with central  bronchiectasis. Scarring and reticulation in the bilateral lower lungs  with atelectasis without dominant nodule or mass lesion. No focal  groundglass consolidation or effusion. Degenerative changes of the spine  without aggressive osseous or soft tissue body wall findings. Visualized  portions of the upper abdomen are grossly unremarkable.     IMPRESSION:  1.Chronic lung changes along with prior healed granulomatous  involvement. Lung RADS category 1 with recommended annual followup.  2. Progressive mild to moderate hiatal hernia along with questionable  mucosal thickening of the distal esophagus.     D:  05/13/2019  E:  05/13/2019     This report was finalized on 5/13/2019 3:46 PM by Dr. Ruben Kirk.       PROBLEM LIST    Problem List Items Addressed This Visit        Respiratory    COPD (chronic obstructive pulmonary disease) (CMS/[facility])    Overview     1/18: initiate spiriva, prn albuterol  FEV1 2.19, 76%            Other    Tobacco dependence    Overview     1.5ppd x 60yrs, contemplating quitting.             Other Visit Diagnoses     Abnormal CT of the chest    -  Primary    Relevant Medications    albuterol sulfate  HFA (PROVENTIL HFA;VENTOLIN HFA;PROAIR HFA) inhaler 4 puff (Completed)    Other Relevant Orders    XR Chest PA & Lateral    Pulmonary Function Test (Completed)            DISCUSSION    I do not have the films from his original imaging at [facility] on May 30.  We will call over and get that CD curried over for direct comparison.   However we did go over his CT scan from April 2018 in May 2019.  There are no suspicious nodules or masses on those scans.  We did review that he does have some bilateral lower lobe atelectasis and scarring with bronchiectasis.    We did go over his PFTs today in the office as well which did show some moderate obstructive airway disease with an FEV1 of 2.19, 76% predicted as well as some air trapping with a residual 530%.  He did have quite a bit of improvement with the bronchodilator.  I encouraged him to use his Trelegy for symptom management of his COPD.  He also has a rescue inhaler to use as needed for worsening dyspnea or chest tightness.    I did recommend continued annual low-dose screening CT scans for surveillance.  He will follow up with his PCP for continued screening.      Follow up as needed.      Kristina Rojas, APRN  08/22/20191:50 PM  Electronically signed     Please note that portions of this note were completed with a voice recognition program. Efforts were made to edit the dictations, but occasionally words are mistranscribed.      CC: Prema Frias, OMAIRA

## 2019-08-27 ENCOUNTER — TELEPHONE (OUTPATIENT)
Dept: PULMONOLOGY | Facility: CLINIC | Age: 74
End: 2019-08-27

## 2019-08-27 DIAGNOSIS — F17.200 TOBACCO DEPENDENCE: Primary | ICD-10-CM

## 2019-08-27 DIAGNOSIS — F17.219 CIGARETTE NICOTINE DEPENDENCE WITH NICOTINE-INDUCED DISORDER: ICD-10-CM

## 2019-08-27 NOTE — TELEPHONE ENCOUNTER
I called to follow up on her phone call, we do have the records from the [facility] on his CTA of the Abdomen.  Would continue to follow up on annual low dose screening CT scans due to his smoking history.

## 2019-09-30 ENCOUNTER — OFFICE VISIT (OUTPATIENT)
Dept: FAMILY MEDICINE CLINIC | Facility: CLINIC | Age: 74
End: 2019-09-30

## 2019-09-30 VITALS
OXYGEN SATURATION: 98 % | DIASTOLIC BLOOD PRESSURE: 60 MMHG | WEIGHT: 190 LBS | BODY MASS INDEX: 28.14 KG/M2 | HEART RATE: 88 BPM | SYSTOLIC BLOOD PRESSURE: 128 MMHG | HEIGHT: 69 IN

## 2019-09-30 DIAGNOSIS — I25.10 CORONARY ARTERY DISEASE INVOLVING NATIVE HEART WITHOUT ANGINA PECTORIS, UNSPECIFIED VESSEL OR LESION TYPE: ICD-10-CM

## 2019-09-30 DIAGNOSIS — Z76.89 ENCOUNTER TO ESTABLISH CARE WITH NEW DOCTOR: ICD-10-CM

## 2019-09-30 DIAGNOSIS — I63.039 CEREBROVASCULAR ACCIDENT (CVA) DUE TO THROMBOSIS OF CAROTID ARTERY, UNSPECIFIED BLOOD VESSEL LATERALITY (HCC): ICD-10-CM

## 2019-09-30 DIAGNOSIS — C76.0 SQUAMOUS CELL CARCINOMA OF HEAD AND NECK (HCC): ICD-10-CM

## 2019-09-30 DIAGNOSIS — Z23 NEED FOR PNEUMOCOCCAL VACCINATION: ICD-10-CM

## 2019-09-30 DIAGNOSIS — E78.2 MIXED HYPERLIPIDEMIA: ICD-10-CM

## 2019-09-30 DIAGNOSIS — R73.02 IMPAIRED GLUCOSE TOLERANCE: ICD-10-CM

## 2019-09-30 DIAGNOSIS — Z20.820 VARICELLA EXPOSURE: ICD-10-CM

## 2019-09-30 DIAGNOSIS — I10 HYPERTENSION, UNSPECIFIED TYPE: Primary | ICD-10-CM

## 2019-09-30 DIAGNOSIS — Z95.0 PACEMAKER: ICD-10-CM

## 2019-09-30 DIAGNOSIS — E78.5 HYPERLIPIDEMIA, UNSPECIFIED HYPERLIPIDEMIA TYPE: ICD-10-CM

## 2019-09-30 DIAGNOSIS — F17.200 TOBACCO DEPENDENCE: ICD-10-CM

## 2019-09-30 DIAGNOSIS — J43.9 PULMONARY EMPHYSEMA, UNSPECIFIED EMPHYSEMA TYPE (HCC): ICD-10-CM

## 2019-09-30 DIAGNOSIS — Z23 FLU VACCINE NEED: ICD-10-CM

## 2019-09-30 DIAGNOSIS — I73.9 PVD (PERIPHERAL VASCULAR DISEASE) (HCC): ICD-10-CM

## 2019-09-30 PROBLEM — I71.40 ABDOMINAL AORTIC ANEURYSM (AAA) 3.0 CM TO 5.5 CM IN DIAMETER IN MALE (HCC): Status: ACTIVE | Noted: 2019-05-28

## 2019-09-30 PROCEDURE — 90653 IIV ADJUVANT VACCINE IM: CPT | Performed by: PHYSICIAN ASSISTANT

## 2019-09-30 PROCEDURE — 99214 OFFICE O/P EST MOD 30 MIN: CPT | Performed by: PHYSICIAN ASSISTANT

## 2019-09-30 PROCEDURE — 90732 PPSV23 VACC 2 YRS+ SUBQ/IM: CPT | Performed by: PHYSICIAN ASSISTANT

## 2019-09-30 PROCEDURE — G0008 ADMIN INFLUENZA VIRUS VAC: HCPCS | Performed by: PHYSICIAN ASSISTANT

## 2019-09-30 PROCEDURE — G0009 ADMIN PNEUMOCOCCAL VACCINE: HCPCS | Performed by: PHYSICIAN ASSISTANT

## 2019-09-30 RX ORDER — RAMIPRIL 2.5 MG/1
2.5 CAPSULE ORAL DAILY
Qty: 90 CAPSULE | Refills: 1 | Status: SHIPPED | OUTPATIENT
Start: 2019-09-30 | End: 2020-01-01 | Stop reason: SDUPTHER

## 2019-09-30 RX ORDER — ROSUVASTATIN CALCIUM 40 MG/1
40 TABLET, COATED ORAL DAILY
Qty: 90 TABLET | Refills: 1 | Status: SHIPPED | OUTPATIENT
Start: 2019-09-30 | End: 2020-01-01 | Stop reason: SDUPTHER

## 2019-09-30 RX ORDER — CLOPIDOGREL BISULFATE 75 MG/1
75 TABLET ORAL DAILY
Qty: 90 TABLET | Refills: 1 | Status: SHIPPED | OUTPATIENT
Start: 2019-09-30 | End: 2020-01-01 | Stop reason: SDUPTHER

## 2019-09-30 NOTE — PROGRESS NOTES
Chief Complaint   Patient presents with   • Hyperlipidemia     f/u        HPI     Ceasar Woodard is a pleasant 74 y.o. male who is here for routine follow-up of tobacco dependence, COPD, CAD PVD, Pacemaker, CVA, recent AAA repair, hypertension, hyperlipidemia, GERD and skin cancer.  Patient continues to smoke and has no desire to quit at this time.  He is followed by pulmonology for pulmonary nodule which is stable.  He is noncompliant on his Trelegy.  Denies shortness of breath, worsening cough or congestion today.  He is compliant on ramipril, metoprolol, plavix, ASA and protonix.  His blood pressure is stable and well-controlled.  He has a pacemaker and reports that the cardiologist managing this has retired, he requests referral to establish with someone new.  He see Dr. Lopez for all of his other cardiac issues.  He reports resolution of his leg pain since stopping pletal and lopid.  He is compliant on his rosuvastatin 40 mg QD, he is not fasting today and will return for labs.  He inquires about shingles vaccine and states that he has never had chicken pox.  His friend recently had shingles.  He also states that he has never been vaccinated for either pneumonia and we have no records of these immunizations.  History of skin cancer with a few new spots, not established with dermatology.  Wife is present.    Past Medical History:   Diagnosis Date   • AAA (abdominal aortic aneurysm) (CMS/HCC)    • CAD (coronary artery disease)    • Cancer (CMS/HCC)    • COPD (chronic obstructive pulmonary disease) (CMS/HCC)    • HTN (hypertension)    • Hyperlipidemia    • Hypertension    • Lung nodule    • PAD (peripheral artery disease) (CMS/HCC)    • Stroke (CMS/HCC)        Past Surgical History:   Procedure Laterality Date   • ABDOMINAL AORTIC ANEURYSM REPAIR     • ABDOMINAL SURGERY  06/12/2019   • CORONARY ANGIOPLASTY WITH STENT PLACEMENT     • CORONARY ARTERY BYPASS GRAFT     • PACEMAKER IMPLANTATION     • SKIN GRAFT     •  "SQUAMOUS CELL CARCINOMA EXCISION         Family History   Problem Relation Age of Onset   • COPD Mother    • Alzheimer's disease Mother    • Stroke Father    • Heart attack Father    • COPD Father        Social History     Socioeconomic History   • Marital status:      Spouse name: Not on file   • Number of children: Not on file   • Years of education: Not on file   • Highest education level: Not on file   Tobacco Use   • Smoking status: Current Every Day Smoker     Packs/day: 1.00     Years: 61.00     Pack years: 61.00     Types: Cigarettes   • Smokeless tobacco: Never Used   Substance and Sexual Activity   • Alcohol use: Yes     Alcohol/week: 2.4 oz     Types: 4 Shots of liquor per week     Comment: bourbon   • Drug use: No   • Sexual activity: Defer       No Known Allergies    ROS    Review of Systems   Constitutional: Negative for chills, diaphoresis, fatigue and fever.   HENT: Negative for congestion, postnasal drip and rhinorrhea.    Respiratory: Positive for cough (baseline) and shortness of breath (baseline). Negative for wheezing.    Cardiovascular: Negative for chest pain and leg swelling.   Gastrointestinal: Negative for indigestion.   Musculoskeletal: Negative for arthralgias and myalgias.   Skin: Positive for color change and skin lesions.   Neurological: Negative for dizziness and headache.   Psychiatric/Behavioral: Negative for self-injury, sleep disturbance, suicidal ideas, depressed mood and stress. The patient is not nervous/anxious.        Vitals:    09/30/19 1059   BP: 128/60   Pulse: 88   SpO2: 98%   Weight: 86.2 kg (190 lb)   Height: 174 cm (68.5\")       Current Outpatient Medications on File Prior to Visit   Medication Sig Dispense Refill   • aspirin 81 MG tablet Take 81 mg by mouth Daily.     • nitroglycerin (NITROSTAT) 0.4 MG SL tablet Place 1 tablet under the tongue Every 5 (Five) Minutes As Needed for Chest Pain. Take no more than 3 doses in 15 minutes. 30 tablet 1   • pantoprazole " (PROTONIX) 40 MG EC tablet Take 1 tablet by mouth Daily. 90 tablet 3   • VENTOLIN  (90 Base) MCG/ACT inhaler inhale 2 puffs by mouth every 6 hours  0   • [DISCONTINUED] Cilostazol (PLETAL PO) Take  by mouth.     • [DISCONTINUED] clopidogrel (PLAVIX) 75 MG tablet Take 1 tablet by mouth Daily. 90 tablet 1   • [DISCONTINUED] Fluticasone-Umeclidin-Vilant (TRELEGY ELLIPTA) 100-62.5-25 MCG/INH aerosol powder  Take 1 each by mouth Daily. 28 each 5   • [DISCONTINUED] metoprolol tartrate (LOPRESSOR) 25 MG tablet Take 1 tablet by mouth Daily. 90 tablet 1   • [DISCONTINUED] ramipril (ALTACE) 2.5 MG capsule Take 1 capsule by mouth Daily. 90 capsule 1   • [DISCONTINUED] rosuvastatin (CRESTOR) 40 MG tablet Take 1 tablet by mouth Daily. 90 tablet 1     No current facility-administered medications on file prior to visit.        Results for orders placed or performed in visit on 05/07/19   CBC Auto Differential   Result Value Ref Range    WBC 10.50 3.40 - 10.80 10*3/mm3    RBC 4.77 4.14 - 5.80 10*6/mm3    Hemoglobin 15.1 13.0 - 17.7 g/dL    Hematocrit 48.3 37.5 - 51.0 %    .3 (H) 79.0 - 97.0 fL    MCH 31.7 26.6 - 33.0 pg    MCHC 31.3 (L) 31.5 - 35.7 g/dL    RDW 14.9 12.3 - 15.4 %    RDW-SD 56.6 (H) 37.0 - 54.0 fl    MPV 12.3 (H) 6.0 - 12.0 fL    Platelets 300 140 - 450 10*3/mm3    Neutrophil % 55.0 42.7 - 76.0 %    Lymphocyte % 33.9 19.6 - 45.3 %    Monocyte % 6.6 5.0 - 12.0 %    Eosinophil % 2.8 0.3 - 6.2 %    Basophil % 0.9 0.0 - 1.5 %    Immature Grans % 0.8 (H) 0.0 - 0.5 %    Neutrophils, Absolute 5.79 1.70 - 7.00 10*3/mm3    Lymphocytes, Absolute 3.56 (H) 0.70 - 3.10 10*3/mm3    Monocytes, Absolute 0.69 0.10 - 0.90 10*3/mm3    Eosinophils, Absolute 0.29 0.00 - 0.40 10*3/mm3    Basophils, Absolute 0.09 0.00 - 0.20 10*3/mm3    Immature Grans, Absolute 0.08 (H) 0.00 - 0.05 10*3/mm3    nRBC 0.0 0.0 - 0.2 /100 WBC   Comprehensive Metabolic Panel   Result Value Ref Range    Glucose 93 65 - 99 mg/dL    BUN 12 8 - 23  mg/dL    Creatinine 1.00 0.76 - 1.27 mg/dL    Sodium 138 136 - 145 mmol/L    Potassium 4.9 3.5 - 5.2 mmol/L    Chloride 106 98 - 107 mmol/L    CO2 21.0 (L) 22.0 - 29.0 mmol/L    Calcium 9.6 8.6 - 10.5 mg/dL    Total Protein 7.9 6.0 - 8.5 g/dL    Albumin 4.50 3.50 - 5.20 g/dL    ALT (SGPT) 11 1 - 41 U/L    AST (SGOT) 21 1 - 40 U/L    Alkaline Phosphatase 105 39 - 117 U/L    Total Bilirubin 0.3 0.2 - 1.2 mg/dL    eGFR Non African Amer 73 >60 mL/min/1.73    Globulin 3.4 gm/dL    A/G Ratio 1.3 g/dL    BUN/Creatinine Ratio 12.0 7.0 - 25.0    Anion Gap 11.0 mmol/L   TSH   Result Value Ref Range    TSH 2.120 0.270 - 4.200 mIU/mL   Hemoglobin A1c   Result Value Ref Range    Hemoglobin A1C 6.28 (H) 4.80 - 5.60 %   Lipid Panel   Result Value Ref Range    Total Cholesterol 148 0 - 200 mg/dL    Triglycerides 74 0 - 150 mg/dL    HDL Cholesterol 54 40 - 60 mg/dL    LDL Cholesterol  79 0 - 100 mg/dL    VLDL Cholesterol 14.8 5 - 40 mg/dL    LDL/HDL Ratio 1.47    PSA Screen   Result Value Ref Range    PSA 1.560 0.000 - 4.000 ng/mL       PE    Physical Exam   Constitutional: He appears well-developed and well-nourished. He is active and cooperative. No distress.   HENT:   Head: Normocephalic and atraumatic.   Eyes: EOM are normal.   Neck: Normal range of motion.   Cardiovascular: Normal rate, regular rhythm and normal heart sounds.   Pulmonary/Chest: Effort normal. He has decreased breath sounds. He has wheezes. He has rales.   Congested cough, patient states this is baseline for him.   Musculoskeletal: Normal range of motion. He exhibits no edema.   Neurological: He is alert.   Skin: Skin is warm. He is not diaphoretic. No erythema.        Psychiatric: He has a normal mood and affect. His speech is normal and behavior is normal. Judgment and thought content normal. He is not actively hallucinating. He is attentive.   Vitals reviewed.      A/P    Ceasar was seen today for hyperlipidemia.    Diagnoses and all orders for this  visit:    Hypertension, unspecified type  -stable, well-controlled  -followed by Dr. Lopez with cardiology  -     Comprehensive Metabolic Panel; Future  -     metoprolol tartrate (LOPRESSOR) 25 MG tablet; Take 1 tablet by mouth Daily.  -     ramipril (ALTACE) 2.5 MG capsule; Take 1 capsule by mouth Daily.    Hyperlipidemia, unspecified hyperlipidemia type  -on rosuvastatin 40 mg QD  -myalgias have resolved with discontinuation of lopid and pletal  -not fasting today, will return for labs  -     Lipid Panel; Future    Impaired glucose tolerance  -aware we may need to start metformin if hemoglobin AIC remains elevated given his co-morbidities  -     Hemoglobin A1c; Future    Cerebrovascular accident (CVA) due to thrombosis of carotid artery, unspecified blood vessel laterality (CMS/HCC)  -     clopidogrel (PLAVIX) 75 MG tablet; Take 1 tablet by mouth Daily.    Pulmonary emphysema, unspecified emphysema type (CMS/HCC)  -followed by pulmonology  -he reports baseline cough and shortness of breath, no changes today  -has pulmonary nodule that is being followed  -noncompliant on Trelegy, encouraged to take daily and gave samples  -     Fluticasone-Umeclidin-Vilant (TRELEGY ELLIPTA) 100-62.5-25 MCG/INH aerosol powder ; Take 1 each by mouth Daily.    Pacemaker  -recent cardiologist managing pacemaker has retired, will refer to Dr. Haji  -     Ambulatory Referral to Cardiology    Squamous cell carcinoma of head and neck (CMS/HCC)  -has a few new spots, looks mostly like actinic keratosis  -has not been to dermatologist in awhile, will refer to establish for full body scan  -     Ambulatory Referral to Dermatology    Encounter to establish care with new doctor  -     Ambulatory Referral to Dermatology    Varicella exposure  -patient's friend recently had shingles  -patient states he has never had chicken pox or vaccine   -     Varicella Zoster Antibody, IgG; Future    Need for pneumococcal vaccination  -     Pneumococcal  Polysaccharide Vaccine 23-Valent (PPSV23) Greater Than or Equal To 1yo Subcutaneous / IM    Flu vaccine need  -     Fluad Tri 65yr ([id number])    Coronary artery disease involving native heart without angina pectoris, unspecified vessel or lesion type  -followed by Dr. Lopez  -compliant on all medications    Tobacco dependence  -continues to smoke  -patient has no desire to quit today, aware that he needs to    PVD (peripheral vascular disease) (CMS/[facility])  -ongoing issue  -discontinued pletal and his leg pain has improved       Plan of care reviewed with patient at the conclusion of today's visit. Education was provided regarding diagnosis, management and any prescribed or recommended OTC medications.  Patient verbalizes understanding of and agreement with management plan.    Return in about 3 months (around 12/30/2019) for Recheck.     Prema Frias PA-C

## 2019-11-14 ENCOUNTER — OFFICE VISIT (OUTPATIENT)
Dept: FAMILY MEDICINE CLINIC | Facility: CLINIC | Age: 74
End: 2019-11-14

## 2019-11-14 ENCOUNTER — TELEPHONE (OUTPATIENT)
Dept: FAMILY MEDICINE CLINIC | Facility: CLINIC | Age: 74
End: 2019-11-14

## 2019-11-14 VITALS
HEART RATE: 85 BPM | OXYGEN SATURATION: 97 % | BODY MASS INDEX: 26.63 KG/M2 | DIASTOLIC BLOOD PRESSURE: 82 MMHG | HEIGHT: 70 IN | WEIGHT: 186 LBS | SYSTOLIC BLOOD PRESSURE: 120 MMHG

## 2019-11-14 DIAGNOSIS — M70.51 PES ANSERINUS BURSITIS OF RIGHT KNEE: Primary | ICD-10-CM

## 2019-11-14 DIAGNOSIS — Z79.01 CHRONIC ANTICOAGULATION: ICD-10-CM

## 2019-11-14 PROCEDURE — 99213 OFFICE O/P EST LOW 20 MIN: CPT | Performed by: PHYSICIAN ASSISTANT

## 2019-11-14 NOTE — PROGRESS NOTES
Chief Complaint   Patient presents with   • Knee Pain     right x1 month       HPI      Ceasar Woodard is a 74 y.o. male who presents for Knee Pain (right x1 month).  Patient presents with new onset right knee pain that has worsened in the last month.  Was seen in [facility] in [place] and told to rest, ice/heat and take tylenol prn.  Patient has not had any improvement in the pain.  Worse with sitting to standing along medial aspect.  Pain with activity, none at rest/sitting/lying.  No trauma/injury.  Has chronic OA.  No swelling, erythema.  On plavix and ASA.  Can't take NSAIDs.  Using cane to ambulate secondary to pain.  Wife is present with patient.      Past Medical History:   Diagnosis Date   • AAA (abdominal aortic aneurysm) (CMS/[facility])    • CAD (coronary artery disease)    • Cancer (CMS/[facility])    • COPD (chronic obstructive pulmonary disease) (CMS/[facility])    • HTN (hypertension)    • Hyperlipidemia    • Hypertension    • Lung nodule    • PAD (peripheral artery disease) (CMS/[facility])    • Stroke (CMS/[facility])        Past Surgical History:   Procedure Laterality Date   • ABDOMINAL AORTIC ANEURYSM REPAIR     • ABDOMINAL SURGERY  06/12/2019   • CORONARY ANGIOPLASTY WITH STENT PLACEMENT     • CORONARY ARTERY BYPASS GRAFT     • PACEMAKER IMPLANTATION     • SKIN GRAFT     • SQUAMOUS CELL CARCINOMA EXCISION         Family History   Problem Relation Age of Onset   • COPD Mother    • Alzheimer's disease Mother    • Stroke Father    • Heart attack Father    • COPD Father        Social History     Socioeconomic History   • Marital status:      Spouse name: Not on file   • Number of children: Not on file   • Years of education: Not on file   • Highest education level: Not on file   Tobacco Use   • Smoking status: Current Every Day Smoker     Packs/day: 1.00     Years: 61.00     Pack years: 61.00     Types: Cigarettes   • Smokeless tobacco: Never Used   Substance and Sexual Activity   • Alcohol use: Yes     Alcohol/week: 2.4 oz      "Types: 4 Shots of liquor per week     Comment: bourbon   • Drug use: No   • Sexual activity: Defer       No Known Allergies    ROS    Review of Systems   Constitutional: Negative for chills, diaphoresis, fatigue and fever.   Musculoskeletal: Positive for arthralgias, gait problem and bursitis.       Vitals:    11/14/19 1055   BP: 120/82   Pulse: 85   SpO2: 97%   Weight: 84.4 kg (186 lb)   Height: 177.8 cm (70\")     Body mass index is 26.69 kg/m².    Current Outpatient Medications on File Prior to Visit   Medication Sig Dispense Refill   • aspirin 81 MG tablet Take 81 mg by mouth Daily.     • clopidogrel (PLAVIX) 75 MG tablet Take 1 tablet by mouth Daily. 90 tablet 1   • Fluticasone-Umeclidin-Vilant (TRELEGY ELLIPTA) 100-62.5-25 MCG/INH aerosol powder  Take 1 each by mouth Daily. 28 each 5   • metoprolol tartrate (LOPRESSOR) 25 MG tablet Take 1 tablet by mouth Daily. 90 tablet 1   • nitroglycerin (NITROSTAT) 0.4 MG SL tablet Place 1 tablet under the tongue Every 5 (Five) Minutes As Needed for Chest Pain. Take no more than 3 doses in 15 minutes. 30 tablet 1   • pantoprazole (PROTONIX) 40 MG EC tablet Take 1 tablet by mouth Daily. 90 tablet 3   • ramipril (ALTACE) 2.5 MG capsule Take 1 capsule by mouth Daily. 90 capsule 1   • rosuvastatin (CRESTOR) 40 MG tablet Take 1 tablet by mouth Daily. 90 tablet 1   • VENTOLIN  (90 Base) MCG/ACT inhaler inhale 2 puffs by mouth every 6 hours  0     No current facility-administered medications on file prior to visit.        Results for orders placed or performed in visit on 05/07/19   CBC Auto Differential   Result Value Ref Range    WBC 10.50 3.40 - 10.80 10*3/mm3    RBC 4.77 4.14 - 5.80 10*6/mm3    Hemoglobin 15.1 13.0 - 17.7 g/dL    Hematocrit 48.3 37.5 - 51.0 %    .3 (H) 79.0 - 97.0 fL    MCH 31.7 26.6 - 33.0 pg    MCHC 31.3 (L) 31.5 - 35.7 g/dL    RDW 14.9 12.3 - 15.4 %    RDW-SD 56.6 (H) 37.0 - 54.0 fl    MPV 12.3 (H) 6.0 - 12.0 fL    Platelets 300 140 - 450 " 10*3/mm3    Neutrophil % 55.0 42.7 - 76.0 %    Lymphocyte % 33.9 19.6 - 45.3 %    Monocyte % 6.6 5.0 - 12.0 %    Eosinophil % 2.8 0.3 - 6.2 %    Basophil % 0.9 0.0 - 1.5 %    Immature Grans % 0.8 (H) 0.0 - 0.5 %    Neutrophils, Absolute 5.79 1.70 - 7.00 10*3/mm3    Lymphocytes, Absolute 3.56 (H) 0.70 - 3.10 10*3/mm3    Monocytes, Absolute 0.69 0.10 - 0.90 10*3/mm3    Eosinophils, Absolute 0.29 0.00 - 0.40 10*3/mm3    Basophils, Absolute 0.09 0.00 - 0.20 10*3/mm3    Immature Grans, Absolute 0.08 (H) 0.00 - 0.05 10*3/mm3    nRBC 0.0 0.0 - 0.2 /100 WBC   Comprehensive Metabolic Panel   Result Value Ref Range    Glucose 93 65 - 99 mg/dL    BUN 12 8 - 23 mg/dL    Creatinine 1.00 0.76 - 1.27 mg/dL    Sodium 138 136 - 145 mmol/L    Potassium 4.9 3.5 - 5.2 mmol/L    Chloride 106 98 - 107 mmol/L    CO2 21.0 (L) 22.0 - 29.0 mmol/L    Calcium 9.6 8.6 - 10.5 mg/dL    Total Protein 7.9 6.0 - 8.5 g/dL    Albumin 4.50 3.50 - 5.20 g/dL    ALT (SGPT) 11 1 - 41 U/L    AST (SGOT) 21 1 - 40 U/L    Alkaline Phosphatase 105 39 - 117 U/L    Total Bilirubin 0.3 0.2 - 1.2 mg/dL    eGFR Non African Amer 73 >60 mL/min/1.73    Globulin 3.4 gm/dL    A/G Ratio 1.3 g/dL    BUN/Creatinine Ratio 12.0 7.0 - 25.0    Anion Gap 11.0 mmol/L   TSH   Result Value Ref Range    TSH 2.120 0.270 - 4.200 mIU/mL   Hemoglobin A1c   Result Value Ref Range    Hemoglobin A1C 6.28 (H) 4.80 - 5.60 %   Lipid Panel   Result Value Ref Range    Total Cholesterol 148 0 - 200 mg/dL    Triglycerides 74 0 - 150 mg/dL    HDL Cholesterol 54 40 - 60 mg/dL    LDL Cholesterol  79 0 - 100 mg/dL    VLDL Cholesterol 14.8 5 - 40 mg/dL    LDL/HDL Ratio 1.47    PSA Screen   Result Value Ref Range    PSA 1.560 0.000 - 4.000 ng/mL       PE    Physical Exam   Constitutional: He appears well-developed and well-nourished. He is active and cooperative. No distress.   HENT:   Head: Normocephalic and atraumatic.   Eyes: EOM are normal.   Neck: Normal range of motion.   Cardiovascular: Normal  rate, regular rhythm and normal heart sounds.   Pulmonary/Chest: Effort normal. He has decreased breath sounds. He has wheezes. He has rales.   Baseline lung sounds for patient     Musculoskeletal: Normal range of motion.        Right knee: He exhibits normal range of motion, no swelling, no effusion, no laceration and no erythema. No medial joint line and no lateral joint line tenderness noted.        Legs:  Neurological: He is alert.   Skin: Skin is warm. He is not diaphoretic. No erythema.   Psychiatric: He has a normal mood and affect. His speech is normal and behavior is normal. Judgment and thought content normal. He is not actively hallucinating. He is attentive.        A/P    Ceasar was seen today for knee pain.    Diagnoses and all orders for this visit:    Pes anserinus bursitis of right knee  -     Ambulatory Referral to Orthopedic Surgery  -     diclofenac (VOLTAREN) 1 % gel gel; Apply 4 g topically to the appropriate area as directed 4 (Four) Times a Day As Needed (prn for pain).  TTP along bursa.  Pain is worse with sitting to standing motion.  Ongoing for the last month and getting worse.  Using cane to ambulate.  On plavix and ASA, can't take NSAIDs.  Will trial topical diclofenac focally twice a day.  Take tylenol prn pain.  Rest, elevation, compression, heat/ice and handout given on stretches/strengthening exercises  Will refer to ortho for possible steroid injection if pain continues    Chronic anticoagulation  -     Ambulatory Referral to Orthopedic Surgery  On plavix and ASA.         Plan of care reviewed with patient at the conclusion of today's visit. Education was provided regarding diagnosis, management and any prescribed or recommended OTC medications.  Patient verbalizes understanding of and agreement with management plan.    No Follow-up on file.     Prema Frias PA-C

## 2019-11-14 NOTE — TELEPHONE ENCOUNTER
Patients wife advised that her  went to go  the Voltaren today from pharmacy and when she got there she was [facility]     Patient would like to see if it could be sent to [facility] on [phone]

## 2019-11-14 NOTE — PATIENT INSTRUCTIONS
Pes Anserine Bursitis    The pes anserine is an area on the inside of your knee, just below the joint, that is cushioned by a fluid-filled sac (bursa). Pes anserine bursitis is a condition that happens when the bursa gets swollen and irritated. The condition causes knee pain.  What are the causes?  This condition may be caused by:  · Making the same movement over and over.  · A direct hit (trauma) to the inside of the leg.  What increases the risk?  You are more likely to develop this condition if you:  · Are a runner.  · Play sports that involve a lot of running and quick side-to-side movements (cutting).  · Are an athlete who plays contact sports.  · Swim using an inward angle of the knee, such as with the breaststroke.  · Have tight hamstring muscles.  · Are a woman.  · Are overweight.  · Have flat feet.  · Have diabetes or osteoarthritis.  What are the signs or symptoms?  Symptoms of this condition include:  · Knee pain that gets better with rest and worse with activities like climbing stairs, walking, running, or getting in and out of a chair.  · Swelling.  · Warmth.  · Tenderness when pressing at the inside of the lower leg, just below the knee.  How is this diagnosed?  This condition may be diagnosed based on:  · Your symptoms.  · Your medical history.  · A physical exam.  ? During your physical exam, your health care provider will press on the tendon attachment to see if you feel pain.  ? Your health care provider may also check your hip and knee motion and strength.  · Tests to check for swelling and fluid buildup in the bursa and to look at muscles, bones, and tendons. These tests might include:  ? X-rays.  ? MRI.  ? Ultrasound.  How is this treated?  This condition may be treated by:  · Resting your knee. You may be told to raise (elevate) your knee while resting.  · Avoiding activities that cause pain.  · Icing the inside of your knee.  · Sleeping with a pillow between your knees. This will cushion  your injured knee.  · Taking medicine by mouth (orally) to reduce pain and swelling or having medicine injected into your knee.  · Doing strengthening and stretching exercises (physical therapy).  If these treatments do not work or if the condition keeps coming back, you may need to have surgery to remove the bursa.  Follow these instructions at home:  Managing pain, stiffness, and swelling    · If directed, put ice on the injured area.  ? Put ice in a plastic bag.  ? Place a towel between your skin and the bag.  ? Leave the ice on for 20 minutes, 2-3 times a day.  · Elevate the injured area above the level of your heart while you are sitting or lying down.  Activity  · Return to your normal activities as told by your health care provider. Ask your health care provider what activities are safe for you.  · Do exercises as told by your health care provider.  General instructions  · Take over-the-counter and prescription medicines only as told by your health care provider.  · Sleep with a pillow between your knees.  · Do not use any products that contain nicotine or tobacco, such as cigarettes, e-cigarettes, and chewing tobacco. These can delay healing. If you need help quitting, ask your health care provider.  · If you are overweight, work with your health care provider and a dietitian to set a weight-loss goal that is healthy and reasonable for you.  · Keep all follow-up visits as told by your health care provider. This is important.  How is this prevented?  · When exercising, make sure that you:  ? Warm up and stretch before being active.  ? Cool down and stretch after being active.  ? Give your body time to rest between periods of activity.  ? Use equipment that fits you.  ? Are safe and responsible while being active to avoid falls.  ? Do at least 150 minutes of moderate-intensity exercise each week, such as brisk walking or water aerobics.  ? Maintain physical fitness,  including:  § Strength.  § Flexibility.  § Cardiovascular fitness.  § Endurance.  ? Maintain a healthy weight.  Contact a health care provider if:  · Your symptoms do not improve.  · Your symptoms get worse.  Summary  · Pes anserine bursitis is a condition that happens when the fluid-filled sac (bursa) at the inside of your knee gets swollen and irritated. The condition causes knee pain.  · Treatment for pes anserine bursitis may include resting your knee, icing the inside of your knee, sleeping with a pillow between your knees, taking medicine by mouth or by injection, and doing strengthening and stretching exercises (physical therapy).  · Follow instructions for managing pain, stiffness, and swelling.  · Take over-the-counter and prescription medicines only as told by your health care provider.  This information is not intended to replace advice given to you by your health care provider. Make sure you discuss any questions you have with your health care provider.  Document Released: 12/18/2006 Document Revised: 05/29/2019 Document Reviewed: 05/29/2019  [employer] Interactive Patient Education © 2019 [employer]

## 2019-11-14 NOTE — TELEPHONE ENCOUNTER
Per pt chart, rx was sent to [facility]. Tried to call pharmacy to confirm but was placed on hold and call was never picked up. Tried multiple times. Tried to call pt to inform of rx being sent to local pharmacy. LVM advising pt of this and to call back with other issues or questions.

## 2019-11-26 ENCOUNTER — OFFICE VISIT (OUTPATIENT)
Dept: ORTHOPEDIC SURGERY | Facility: CLINIC | Age: 74
End: 2019-11-26

## 2019-11-26 VITALS — BODY MASS INDEX: 26.64 KG/M2 | HEIGHT: 70 IN | HEART RATE: 88 BPM | WEIGHT: 186.07 LBS | OXYGEN SATURATION: 99 %

## 2019-11-26 DIAGNOSIS — M17.11 PRIMARY OSTEOARTHRITIS OF RIGHT KNEE: Primary | ICD-10-CM

## 2019-11-26 DIAGNOSIS — M25.561 RIGHT KNEE PAIN, UNSPECIFIED CHRONICITY: ICD-10-CM

## 2019-11-26 PROCEDURE — 99203 OFFICE O/P NEW LOW 30 MIN: CPT | Performed by: ORTHOPAEDIC SURGERY

## 2019-11-26 NOTE — PROGRESS NOTES
Orthopaedic Clinic Note: Knee New Patient    Chief Complaint   Patient presents with   • Right Knee - Pain        HPI    Ceasar Woodard is a 74 y.o. male who presents with right knee pain for 2 month(s). Onset has been atraumatic and gradual nature.  Pain is localized to the patellofemoral joint and medial joint line.  He is complaining of intermittent pain and stiffness in the knee but denies any josefa swelling.  He is also having an aching sensation in the knee.  Rates his pain 7/10 on the pain scale.  His pain is worse with walking weightbearing activities.  Sitting and resting eases his pain.  Prior treatments have included Tylenol and recently prescribed Voltaren gel.  He comes to clinic today for further evaluation of his ongoing right knee pain which is causing limitations in daily activities.  He is ambulating with the assistance of a cane today.     Past Medical History:   Diagnosis Date   • AAA (abdominal aortic aneurysm) (CMS/[facility])    • CAD (coronary artery disease)    • Cancer (CMS/[facility])    • COPD (chronic obstructive pulmonary disease) (CMS/[facility])    • HTN (hypertension)    • Hyperlipidemia    • Hypertension    • Lung nodule    • PAD (peripheral artery disease) (CMS/[facility])    • Stroke (CMS/[facility])       Past Surgical History:   Procedure Laterality Date   • ABDOMINAL AORTIC ANEURYSM REPAIR     • ABDOMINAL SURGERY  06/12/2019   • CORONARY ANGIOPLASTY WITH STENT PLACEMENT     • CORONARY ARTERY BYPASS GRAFT     • PACEMAKER IMPLANTATION     • SKIN GRAFT     • SQUAMOUS CELL CARCINOMA EXCISION        Family History   Problem Relation Age of Onset   • COPD Mother    • Alzheimer's disease Mother    • Stroke Father    • Heart attack Father    • COPD Father      Social History     Socioeconomic History   • Marital status:      Spouse name: Not on file   • Number of children: Not on file   • Years of education: Not on file   • Highest education level: Not on file   Tobacco Use   • Smoking status: Current Every Day  Smoker     Packs/day: 2.00     Years: 61.00     Pack years: 122.00     Types: Cigarettes     Start date: 1959   • Smokeless tobacco: Never Used   Substance and Sexual Activity   • Alcohol use: Yes     Alcohol/week: 2.4 oz     Types: 4 Shots of liquor per week     Comment: bourbon   • Drug use: No   • Sexual activity: Defer      Current Outpatient Medications on File Prior to Visit   Medication Sig Dispense Refill   • aspirin 81 MG tablet Take 81 mg by mouth Daily.     • clopidogrel (PLAVIX) 75 MG tablet Take 1 tablet by mouth Daily. 90 tablet 1   • diclofenac (VOLTAREN) 1 % gel gel Apply 4 g topically to the appropriate area as directed 4 (Four) Times a Day As Needed (prn for pain). 60 tube 0   • Fluticasone-Umeclidin-Vilant (TRELEGY ELLIPTA) 100-62.5-25 MCG/INH aerosol powder  Take 1 each by mouth Daily. 28 each 5   • metoprolol tartrate (LOPRESSOR) 25 MG tablet Take 1 tablet by mouth Daily. 90 tablet 1   • nitroglycerin (NITROSTAT) 0.4 MG SL tablet Place 1 tablet under the tongue Every 5 (Five) Minutes As Needed for Chest Pain. Take no more than 3 doses in 15 minutes. 30 tablet 1   • pantoprazole (PROTONIX) 40 MG EC tablet Take 1 tablet by mouth Daily. 90 tablet 3   • ramipril (ALTACE) 2.5 MG capsule Take 1 capsule by mouth Daily. 90 capsule 1   • rosuvastatin (CRESTOR) 40 MG tablet Take 1 tablet by mouth Daily. 90 tablet 1   • VENTOLIN  (90 Base) MCG/ACT inhaler inhale 2 puffs by mouth every 6 hours  0     No current facility-administered medications on file prior to visit.       No Known Allergies     Review of Systems   Constitutional: Negative.    HENT: Negative.    Eyes: Negative.    Respiratory: Negative.    Cardiovascular: Negative.    Gastrointestinal: Negative.    Endocrine: Negative.    Genitourinary: Negative.    Musculoskeletal: Positive for arthralgias.   Skin: Negative.    Allergic/Immunologic: Negative.    Neurological: Negative.    Hematological: Negative.    Psychiatric/Behavioral:  "Negative.         The patient's Review of Systems was personally reviewed and confirmed as accurate.    The following portions of the patient's history were reviewed and updated as appropriate: allergies, current medications, past family history, past medical history, past social history, past surgical history and problem list.    Physical Exam  Pulse 88, height 177.8 cm (70\"), weight 84.4 kg (186 lb 1.1 oz), SpO2 99 %.    Body mass index is 26.7 kg/m².    GENERAL APPEARANCE: awake, alert & oriented x 3, in no acute distress and well developed, well nourished  PSYCH: normal affect  LUNGS:  breathing nonlabored  EYES: sclera anicteric  CARDIOVASCULAR: palpable dorsalis pedis, palpable posterior tibial bilaterally. Capillary refill less than 2 seconds  EXTREMITIES: no clubbing, cyanosis  GAIT:  Normal            Right Lower Extremity Exam:   ----------  Hip Exam  ----------  FLEXION CONTRACTURE: None  FLEXION: 110 degrees  INTERNAL ROTATION: 20 degrees at 90 degrees of flexion   EXTERNAL ROTATION: 40 degrees at 90 degrees of flexion    PAIN WITH HIP MOTION: no  ----------  Knee Exam  ----------  ALIGNMENT: neutral, no varus or valgus deformity     RANGE OF MOTION:  Normal (0-120 degrees) with no extensor lag or flexion contracture  LIGAMENTOUS STABILITY:   stable to varus and valgus stress at 0 and 30 degrees without any evidence of laxity     STRENGTH:  5/5 knee flexion, extension. 5/5 ankle dorsiflexion and plantarflexion.     PAIN WITH PALPATION: Tender palpation about medial joint line, no lateral joint line pain  KNEE EFFUSION: no  PAIN WITH KNEE ROM: Yes  PATELLAR CREPITUS: yes, painful and symptomatic  SPECIAL EXAM FINDINGS: Painful patellar compression    REFLEXES:  PATELLAR 2+/4  ACHILLES 2+/4    CLONUS: negative  STRAIGHT LEG TEST:   negative    SENSATION TO LIGHT TOUCH:  DEEP PERONEAL/SUPERFICIAL PERONEAL/SURAL/SAPHENOUS/TIBIAL:   intact    EDEMA:  no  ERYTHEMA:  no  WOUNDS/INCISIONS: no overlying skin " problems.      Left Lower Extremity Exam:   ----------  Hip Exam  ----------  FLEXION CONTRACTURE: None  FLEXION: 110 degrees  INTERNAL ROTATION: 20 degrees at 90 degrees of flexion   EXTERNAL ROTATION: 40 degrees at 90 degrees of flexion    PAIN WITH HIP MOTION: no  ----------  Knee Exam  ----------  ALIGNMENT: neutral, no varus or valgus deformity     RANGE OF MOTION:  Normal (0-120 degrees) with no extensor lag or flexion contracture  LIGAMENTOUS STABILITY:   stable to varus and valgus stress at 0 and 30 degrees without any evidence of laxity     STRENGTH:  5/5 knee flexion, extension. 5/5 ankle dorsiflexion and plantarflexion.     PAIN WITH PALPATION: denies tenderness to palpation about the knee, denies medial or lateral joint line pain  KNEE EFFUSION: no  PAIN WITH KNEE ROM: no  PATELLAR CREPITUS: yes, asymptomatic  SPECIAL EXAM FINDINGS:  Negative patellar compression    REFLEXES:  PATELLAR 2+/4  ACHILLES 2+/4    CLONUS: negative  STRAIGHT LEG TEST:   negative    SENSATION TO LIGHT TOUCH:  DEEP PERONEAL/SUPERFICIAL PERONEAL/SURAL/SAPHENOUS/TIBIAL:   intact    EDEMA:  no  ERYTHEMA:  no  WOUNDS/INCISIONS: no overlying skin problems.    ______________________________________________________________________  ______________________________________________________________________    RADIOGRAPHIC FINDINGS:   Indication: Right knee pain    Comparison: No prior xrays are available for comparison    Right knee(s) 4 views: moderate tricompartmental osteoarthritis with no acute bony injury or fracture.      Assessment/Plan:   Diagnosis Plan   1. Primary osteoarthritis of right knee     2. Right knee pain, unspecified chronicity  XR Knee 4+ View Right     Patient has right knee osteoarthritis.  I discussed treatment options.  At this time, he believes he is getting some relief with the Voltaren gel.  No further intervention warranted at this time other than continuing Tylenol as needed.  I discussed potentially proceeding  with a cortisone injection in the knee to decrease inflammation.  He declines this today.  He will follow-up as needed.    Alex Gutierrez MD  11/26/19  11:31 AM

## 2019-12-02 ENCOUNTER — TELEPHONE (OUTPATIENT)
Dept: ORTHOPEDIC SURGERY | Facility: CLINIC | Age: 74
End: 2019-12-02

## 2019-12-02 NOTE — TELEPHONE ENCOUNTER
I did discuss the x-ray results with him.  I told him is symptoms were due to osteoarthritis.  He has moderate tricompartmental arthritis as seen on x-ray.  Please relay this to him.

## 2019-12-02 NOTE — TELEPHONE ENCOUNTER
Regarding: Test Results Question  Contact: [phone]  ----- Message from [employer] Generic sent at 11/29/2019 10:19 PM EST -----    Dr Gutierrez,    While Koffi Woodard was in your office 11/26 x-rays were taken of his right knee.  We didn't see the results on the portal and did not discuss this at his appointment.

## 2019-12-23 NOTE — PATIENT INSTRUCTIONS
"Please make appointment at dermatologist, have his lip looked at.  Call if his cough does not improve or worsens at anytime.  Please return to lab fasting between Monday - Friday at 8am.  Make sure to tell them to obtain the labs from today and September (total of 6 labs ordered).    Patient needs to schedule his colonoscopy.    · Attain adequate rest and increase clear fluid intake.   · Use warm salt water gargles, lozenges, honey as a natural antitussive, and/or Delsym syrup as needed for cough.   · Use Mucinex 600 mg twice daily and nasal saline irrigation with \"ocean\" or \"simply saline\" brand sterile nasal spray twice daily.   · Use warm compresses over sinuses for comfort.   · Alternate use of Tylenol 500 mg and Ibuprofen 400 mg every 4 hours as needed for headache, body aches, and/or fever reduction  · Use Loratadine (Claritin), Cetrizine (Zyrtec), or Fexofenadine (Allegra) once daily over the counter, Flonase 1 spray each nostril daily, and auto-inflate ears using modified valsalva maneuver approximately 20 times daily for ear congestion.   · Recommend probiotic, plain yogurt or kefir while taking an antibiotic to help mitigate GI symptoms.    Call or Return to office if symptoms worsen or persist.     "

## 2019-12-23 NOTE — PROGRESS NOTES
Chief Complaint   Patient presents with   • Bursitis     r knee, Pt is here for a follow up       HPI     Ceasar Woodard is a pleasant 74 y.o. male who is here for routine follow-up of COPD, tobacco abuse, hypertension, chronic pain of right knee, history of squamous cell carcinoma, lip lesion, fatigue and confusion.  Patient presents with new onset productive cough with discolored sputum that started a few weeks ago and has not improved.  Patient continues to smoke daily and has no desire to quit.  He is aware he needs to.  Not using COPD inhaler as prescribed.  Blood pressure is stable and well-controlled.  He is followed by cardiology and pulmonology.  He has chronic right knee pain that is stable and doing well today.  He will follow-up with Dr. Gutierrez if his pain returns.  He has not been to dermatologist recently.  Has lesion on left side of lower lip that needs to be evaluated and he will call to schedule.  His wife is present at appointment and she reports he has worsening fatigue and is sleeping more recently.  He also has some confusion and is not mentally as with it as he has been in the past.  No weight loss recently.  He still needs to get his blood work completed.    Past Medical History:   Diagnosis Date   • AAA (abdominal aortic aneurysm) (CMS/HCC)    • CAD (coronary artery disease)    • Cancer (CMS/HCC)    • COPD (chronic obstructive pulmonary disease) (CMS/HCC)    • HTN (hypertension)    • Hyperlipidemia    • Hypertension    • Lung nodule    • PAD (peripheral artery disease) (CMS/HCC)    • Stroke (CMS/HCC)        Past Surgical History:   Procedure Laterality Date   • ABDOMINAL AORTIC ANEURYSM REPAIR     • ABDOMINAL SURGERY  06/12/2019   • CORONARY ANGIOPLASTY WITH STENT PLACEMENT     • CORONARY ARTERY BYPASS GRAFT     • PACEMAKER IMPLANTATION     • SKIN GRAFT     • SQUAMOUS CELL CARCINOMA EXCISION         Family History   Problem Relation Age of Onset   • COPD Mother    • Alzheimer's disease  "Mother    • Stroke Father    • Heart attack Father    • COPD Father        Social History     Socioeconomic History   • Marital status:      Spouse name: Not on file   • Number of children: Not on file   • Years of education: Not on file   • Highest education level: Not on file   Tobacco Use   • Smoking status: Current Every Day Smoker     Packs/day: 2.00     Years: 61.00     Pack years: 122.00     Types: Cigarettes     Start date: 1959   • Smokeless tobacco: Never Used   Substance and Sexual Activity   • Alcohol use: Yes     Alcohol/week: 4.0 standard drinks     Types: 4 Shots of liquor per week     Comment: bourbon   • Drug use: No   • Sexual activity: Defer       No Known Allergies    ROS    Review of Systems   Constitutional: Positive for fatigue. Negative for chills, diaphoresis and fever.   HENT: Negative for congestion, postnasal drip and rhinorrhea.    Respiratory: Negative for cough, shortness of breath and wheezing.    Cardiovascular: Negative for chest pain and leg swelling.   Neurological: Negative for dizziness and headache.   Psychiatric/Behavioral: Positive for sleep disturbance and stress. Negative for self-injury, suicidal ideas and depressed mood. The patient is not nervous/anxious.        Vitals:    12/23/19 1048   BP: 122/80   Pulse: 81   SpO2: 97%   Weight: 84.6 kg (186 lb 6.4 oz)   Height: 177.8 cm (70\")     Body mass index is 26.75 kg/m².    Current Outpatient Medications on File Prior to Visit   Medication Sig Dispense Refill   • aspirin 81 MG tablet Take 81 mg by mouth Daily.     • clopidogrel (PLAVIX) 75 MG tablet Take 1 tablet by mouth Daily. 90 tablet 1   • diclofenac (VOLTAREN) 1 % gel gel Apply 4 g topically to the appropriate area as directed 4 (Four) Times a Day As Needed (prn for pain). 60 tube 0   • metoprolol tartrate (LOPRESSOR) 25 MG tablet Take 1 tablet by mouth Daily. 90 tablet 1   • nitroglycerin (NITROSTAT) 0.4 MG SL tablet Place 1 tablet under the tongue Every 5 " (Five) Minutes As Needed for Chest Pain. Take no more than 3 doses in 15 minutes. 30 tablet 1   • pantoprazole (PROTONIX) 40 MG EC tablet Take 1 tablet by mouth Daily. 90 tablet 3   • ramipril (ALTACE) 2.5 MG capsule Take 1 capsule by mouth Daily. 90 capsule 1   • rosuvastatin (CRESTOR) 40 MG tablet Take 1 tablet by mouth Daily. 90 tablet 1   • VENTOLIN  (90 Base) MCG/ACT inhaler inhale 2 puffs by mouth every 6 hours  0     No current facility-administered medications on file prior to visit.        Results for orders placed or performed in visit on 05/07/19   CBC Auto Differential   Result Value Ref Range    WBC 10.50 3.40 - 10.80 10*3/mm3    RBC 4.77 4.14 - 5.80 10*6/mm3    Hemoglobin 15.1 13.0 - 17.7 g/dL    Hematocrit 48.3 37.5 - 51.0 %    .3 (H) 79.0 - 97.0 fL    MCH 31.7 26.6 - 33.0 pg    MCHC 31.3 (L) 31.5 - 35.7 g/dL    RDW 14.9 12.3 - 15.4 %    RDW-SD 56.6 (H) 37.0 - 54.0 fl    MPV 12.3 (H) 6.0 - 12.0 fL    Platelets 300 140 - 450 10*3/mm3    Neutrophil % 55.0 42.7 - 76.0 %    Lymphocyte % 33.9 19.6 - 45.3 %    Monocyte % 6.6 5.0 - 12.0 %    Eosinophil % 2.8 0.3 - 6.2 %    Basophil % 0.9 0.0 - 1.5 %    Immature Grans % 0.8 (H) 0.0 - 0.5 %    Neutrophils, Absolute 5.79 1.70 - 7.00 10*3/mm3    Lymphocytes, Absolute 3.56 (H) 0.70 - 3.10 10*3/mm3    Monocytes, Absolute 0.69 0.10 - 0.90 10*3/mm3    Eosinophils, Absolute 0.29 0.00 - 0.40 10*3/mm3    Basophils, Absolute 0.09 0.00 - 0.20 10*3/mm3    Immature Grans, Absolute 0.08 (H) 0.00 - 0.05 10*3/mm3    nRBC 0.0 0.0 - 0.2 /100 WBC   Comprehensive Metabolic Panel   Result Value Ref Range    Glucose 93 65 - 99 mg/dL    BUN 12 8 - 23 mg/dL    Creatinine 1.00 0.76 - 1.27 mg/dL    Sodium 138 136 - 145 mmol/L    Potassium 4.9 3.5 - 5.2 mmol/L    Chloride 106 98 - 107 mmol/L    CO2 21.0 (L) 22.0 - 29.0 mmol/L    Calcium 9.6 8.6 - 10.5 mg/dL    Total Protein 7.9 6.0 - 8.5 g/dL    Albumin 4.50 3.50 - 5.20 g/dL    ALT (SGPT) 11 1 - 41 U/L    AST (SGOT) 21 1 -  40 U/L    Alkaline Phosphatase 105 39 - 117 U/L    Total Bilirubin 0.3 0.2 - 1.2 mg/dL    eGFR Non African Amer 73 >60 mL/min/1.73    Globulin 3.4 gm/dL    A/G Ratio 1.3 g/dL    BUN/Creatinine Ratio 12.0 7.0 - 25.0    Anion Gap 11.0 mmol/L   TSH   Result Value Ref Range    TSH 2.120 0.270 - 4.200 mIU/mL   Hemoglobin A1c   Result Value Ref Range    Hemoglobin A1C 6.28 (H) 4.80 - 5.60 %   Lipid Panel   Result Value Ref Range    Total Cholesterol 148 0 - 200 mg/dL    Triglycerides 74 0 - 150 mg/dL    HDL Cholesterol 54 40 - 60 mg/dL    LDL Cholesterol  79 0 - 100 mg/dL    VLDL Cholesterol 14.8 5 - 40 mg/dL    LDL/HDL Ratio 1.47    PSA Screen   Result Value Ref Range    PSA 1.560 0.000 - 4.000 ng/mL       PE    Physical Exam   Constitutional: Vital signs are normal. He appears well-developed and well-nourished. He is active and cooperative. He has a sickly appearance. No distress. He is not overweight.  HENT:   Head: Normocephalic and atraumatic.   Eyes: EOM are normal.   Neck: Normal range of motion.   Cardiovascular: Normal rate, regular rhythm and normal heart sounds.   Pulmonary/Chest: Effort normal. He has decreased breath sounds. He has wheezes. He has no rhonchi. He has rales.   Wheezing and rales with wet rhonci cough.   Musculoskeletal: Normal range of motion.   Neurological: He is alert.   Skin: Skin is warm. He is not diaphoretic. No erythema.   Psychiatric: He has a normal mood and affect. His speech is normal and behavior is normal. Judgment and thought content normal. He is not actively hallucinating. He is attentive.       A/P    Ceasar was seen today for bursitis.    Diagnoses and all orders for this visit:    COPD exacerbation (CMS/[facility])  -     cefdinir (OMNICEF) 300 MG capsule; Take 1 capsule by mouth Every 12 (Twelve) Hours for 10 days.  -     Home Nebulizer  -     ipratropium-albuterol (DUO-NEB) nebulizer solution 3 mL  -     ipratropium-albuterol (DUO-NEB) 0.5-2.5 mg/3 ml nebulizer; Take 3 mL by  nebulization Every 4 (Four) Hours As Needed for Wheezing.  -     ipratropium-albuterol (DUO-NEB) nebulizer solution 3 mL  Lungs have wheezing and rales.  Nebulizer treatment in office.  Will prescribe home nebulizer.  Started a few days ago, will cover with cefdinir.  Pulse ox is 97%.  If symptoms worsen or do not improve, call office or go to ER/UTC.    Confusion  -     CBC Auto Differential; Future  -     Vitamin B12; Future  Wife reports new and worsening fatigue with confusion.  Patient is sleeping several hours during the day.  Unable to void.  Treating COPD exacerbation with cefdinir, may cover UTI if he has one.    Other fatigue  -     CBC Auto Differential; Future  -     Vitamin B12; Future  Check labs.  May need to refer to sleep medicine for evaluation.    Chronic pain of right knee  Improved with time.  Will follow-up with Dr. Gutierrez if he has any further issues.    Pulmonary emphysema, unspecified emphysema type (CMS/HCC)  -     Fluticasone-Umeclidin-Vilant (TRELEGY ELLIPTA) 100-62.5-25 MCG/INH aerosol powder ; Take 30 puffs by mouth Daily.  Encouraged to take inhaler as directed on a daily basis to help with COPD symptoms.    Squamous cell carcinoma of head and neck (CMS/HCC)  Lesion of lip  Black lesion on lower left lip, recommend he follow-up with dermatology.  History of squamous cell carcinoma and patient has new lesions on skull.  Patient will call his dermatologist and schedule.    Essential hypertension  Stable, well-controlled.  Compliant on medication.    Tobacco dependence  Continues to smoke.  No interest in quitting.  Continue to encourage that he quit, he is aware of dangers of continuing to smoke.         Plan of care reviewed with patient at the conclusion of today's visit. Education was provided regarding diagnosis, management and any prescribed or recommended OTC medications.  Patient verbalizes understanding of and agreement with management plan.    Return in about 4 weeks (around  1/20/2020) for Recheck, confusion.     Prema Frias PA-C

## 2020-01-01 ENCOUNTER — RESULTS ENCOUNTER (OUTPATIENT)
Dept: FAMILY MEDICINE CLINIC | Facility: CLINIC | Age: 75
End: 2020-01-01

## 2020-01-01 ENCOUNTER — TELEPHONE (OUTPATIENT)
Dept: CARDIOLOGY | Facility: CLINIC | Age: 75
End: 2020-01-01

## 2020-01-01 ENCOUNTER — TELEPHONE (OUTPATIENT)
Dept: FAMILY MEDICINE CLINIC | Facility: CLINIC | Age: 75
End: 2020-01-01

## 2020-01-01 ENCOUNTER — CONSULT (OUTPATIENT)
Dept: CARDIOLOGY | Facility: CLINIC | Age: 75
End: 2020-01-01

## 2020-01-01 ENCOUNTER — OFFICE VISIT (OUTPATIENT)
Dept: FAMILY MEDICINE CLINIC | Facility: CLINIC | Age: 75
End: 2020-01-01

## 2020-01-01 ENCOUNTER — LAB (OUTPATIENT)
Dept: LAB | Facility: HOSPITAL | Age: 75
End: 2020-01-01

## 2020-01-01 ENCOUNTER — OFFICE VISIT (OUTPATIENT)
Dept: CARDIOLOGY | Facility: CLINIC | Age: 75
End: 2020-01-01

## 2020-01-01 ENCOUNTER — APPOINTMENT (OUTPATIENT)
Dept: CT IMAGING | Facility: HOSPITAL | Age: 75
End: 2020-01-01

## 2020-01-01 VITALS
HEIGHT: 70 IN | OXYGEN SATURATION: 97 % | HEART RATE: 84 BPM | SYSTOLIC BLOOD PRESSURE: 118 MMHG | BODY MASS INDEX: 27 KG/M2 | WEIGHT: 188.6 LBS | DIASTOLIC BLOOD PRESSURE: 86 MMHG

## 2020-01-01 VITALS
OXYGEN SATURATION: 97 % | WEIGHT: 184 LBS | HEART RATE: 66 BPM | TEMPERATURE: 98.4 F | BODY MASS INDEX: 25.76 KG/M2 | DIASTOLIC BLOOD PRESSURE: 68 MMHG | SYSTOLIC BLOOD PRESSURE: 112 MMHG | HEIGHT: 71 IN

## 2020-01-01 VITALS
HEART RATE: 63 BPM | SYSTOLIC BLOOD PRESSURE: 102 MMHG | DIASTOLIC BLOOD PRESSURE: 60 MMHG | WEIGHT: 186 LBS | BODY MASS INDEX: 26.04 KG/M2 | HEIGHT: 71 IN

## 2020-01-01 VITALS
BODY MASS INDEX: 25.66 KG/M2 | OXYGEN SATURATION: 97 % | WEIGHT: 183.3 LBS | DIASTOLIC BLOOD PRESSURE: 70 MMHG | SYSTOLIC BLOOD PRESSURE: 118 MMHG | HEART RATE: 61 BPM | HEIGHT: 71 IN

## 2020-01-01 VITALS
BODY MASS INDEX: 25.76 KG/M2 | SYSTOLIC BLOOD PRESSURE: 116 MMHG | OXYGEN SATURATION: 97 % | WEIGHT: 184 LBS | HEART RATE: 66 BPM | DIASTOLIC BLOOD PRESSURE: 74 MMHG | HEIGHT: 71 IN

## 2020-01-01 DIAGNOSIS — E78.2 MIXED HYPERLIPIDEMIA: ICD-10-CM

## 2020-01-01 DIAGNOSIS — F17.200 TOBACCO DEPENDENCE: ICD-10-CM

## 2020-01-01 DIAGNOSIS — I10 HYPERTENSION, UNSPECIFIED TYPE: ICD-10-CM

## 2020-01-01 DIAGNOSIS — J43.9 PULMONARY EMPHYSEMA, UNSPECIFIED EMPHYSEMA TYPE (HCC): ICD-10-CM

## 2020-01-01 DIAGNOSIS — Z12.11 SCREEN FOR COLON CANCER: ICD-10-CM

## 2020-01-01 DIAGNOSIS — I44.2 COMPLETE HEART BLOCK (HCC): ICD-10-CM

## 2020-01-01 DIAGNOSIS — I63.039 CEREBROVASCULAR ACCIDENT (CVA) DUE TO THROMBOSIS OF CAROTID ARTERY, UNSPECIFIED BLOOD VESSEL LATERALITY (HCC): ICD-10-CM

## 2020-01-01 DIAGNOSIS — R53.83 OTHER FATIGUE: ICD-10-CM

## 2020-01-01 DIAGNOSIS — I10 ESSENTIAL HYPERTENSION: ICD-10-CM

## 2020-01-01 DIAGNOSIS — E11.65 TYPE 2 DIABETES MELLITUS WITH HYPERGLYCEMIA, WITHOUT LONG-TERM CURRENT USE OF INSULIN (HCC): ICD-10-CM

## 2020-01-01 DIAGNOSIS — M70.51 PES ANSERINUS BURSITIS OF RIGHT KNEE: ICD-10-CM

## 2020-01-01 DIAGNOSIS — F10.10 ETOH ABUSE: ICD-10-CM

## 2020-01-01 DIAGNOSIS — R26.89 BALANCE PROBLEM: ICD-10-CM

## 2020-01-01 DIAGNOSIS — R63.0 LOSS OF APPETITE: ICD-10-CM

## 2020-01-01 DIAGNOSIS — J44.1 COPD EXACERBATION (HCC): ICD-10-CM

## 2020-01-01 DIAGNOSIS — I25.10 CORONARY ARTERY DISEASE INVOLVING NATIVE HEART WITHOUT ANGINA PECTORIS, UNSPECIFIED VESSEL OR LESION TYPE: Primary | ICD-10-CM

## 2020-01-01 DIAGNOSIS — Z13.0 SCREENING FOR DEFICIENCY ANEMIA: ICD-10-CM

## 2020-01-01 DIAGNOSIS — E78.5 HYPERLIPIDEMIA, UNSPECIFIED HYPERLIPIDEMIA TYPE: ICD-10-CM

## 2020-01-01 DIAGNOSIS — C76.0 SQUAMOUS CELL CARCINOMA OF HEAD AND NECK (HCC): ICD-10-CM

## 2020-01-01 DIAGNOSIS — I44.2 COMPLETE HEART BLOCK (HCC): Primary | ICD-10-CM

## 2020-01-01 DIAGNOSIS — R53.83 FATIGUE, UNSPECIFIED TYPE: ICD-10-CM

## 2020-01-01 DIAGNOSIS — I25.10 CORONARY ARTERY DISEASE INVOLVING NATIVE HEART WITHOUT ANGINA PECTORIS, UNSPECIFIED VESSEL OR LESION TYPE: ICD-10-CM

## 2020-01-01 DIAGNOSIS — Z11.59 ENCOUNTER FOR HEPATITIS C SCREENING TEST FOR LOW RISK PATIENT: ICD-10-CM

## 2020-01-01 DIAGNOSIS — I25.10 CORONARY ARTERIOSCLEROSIS IN NATIVE ARTERY: ICD-10-CM

## 2020-01-01 DIAGNOSIS — K21.9 GASTROESOPHAGEAL REFLUX DISEASE WITHOUT ESOPHAGITIS: ICD-10-CM

## 2020-01-01 DIAGNOSIS — Z12.5 PROSTATE CANCER SCREENING: ICD-10-CM

## 2020-01-01 DIAGNOSIS — Z13.29 SCREENING FOR THYROID DISORDER: ICD-10-CM

## 2020-01-01 DIAGNOSIS — F17.200 SMOKER: ICD-10-CM

## 2020-01-01 DIAGNOSIS — C44.609 SKIN CANCER OF ARM, LEFT: ICD-10-CM

## 2020-01-01 DIAGNOSIS — I63.9 CEREBROVASCULAR ACCIDENT (CVA), UNSPECIFIED MECHANISM (HCC): ICD-10-CM

## 2020-01-01 DIAGNOSIS — I73.9 PVD (PERIPHERAL VASCULAR DISEASE) WITH CLAUDICATION (HCC): ICD-10-CM

## 2020-01-01 DIAGNOSIS — Z95.0 PACEMAKER: ICD-10-CM

## 2020-01-01 DIAGNOSIS — J30.2 SEASONAL ALLERGIES: ICD-10-CM

## 2020-01-01 DIAGNOSIS — Z23 FLU VACCINE NEED: ICD-10-CM

## 2020-01-01 DIAGNOSIS — R41.0 CONFUSION: ICD-10-CM

## 2020-01-01 DIAGNOSIS — I73.9 PVD (PERIPHERAL VASCULAR DISEASE) (HCC): ICD-10-CM

## 2020-01-01 DIAGNOSIS — L98.9 FACE LESION: ICD-10-CM

## 2020-01-01 DIAGNOSIS — E11.65 TYPE 2 DIABETES MELLITUS WITH HYPERGLYCEMIA, WITHOUT LONG-TERM CURRENT USE OF INSULIN (HCC): Primary | ICD-10-CM

## 2020-01-01 DIAGNOSIS — I25.10 CORONARY ARTERY DISEASE INVOLVING NATIVE CORONARY ARTERY OF NATIVE HEART WITHOUT ANGINA PECTORIS: Primary | ICD-10-CM

## 2020-01-01 DIAGNOSIS — Z00.00 MEDICARE ANNUAL WELLNESS VISIT, SUBSEQUENT: Primary | ICD-10-CM

## 2020-01-01 DIAGNOSIS — R29.6 FREQUENT FALLS: ICD-10-CM

## 2020-01-01 DIAGNOSIS — R73.02 IMPAIRED GLUCOSE TOLERANCE: ICD-10-CM

## 2020-01-01 DIAGNOSIS — Z20.820 VARICELLA EXPOSURE: ICD-10-CM

## 2020-01-01 DIAGNOSIS — R26.81 GAIT INSTABILITY: ICD-10-CM

## 2020-01-01 LAB
ALBUMIN SERPL-MCNC: 4 G/DL (ref 3.5–5.2)
ALBUMIN SERPL-MCNC: 4.5 G/DL (ref 3.5–5.2)
ALBUMIN UR-MCNC: 2.1 MG/DL
ALBUMIN/GLOB SERPL: 1.5 G/DL
ALBUMIN/GLOB SERPL: 1.7 G/DL
ALP SERPL-CCNC: 146 U/L (ref 39–117)
ALP SERPL-CCNC: 80 U/L (ref 39–117)
ALT SERPL W P-5'-P-CCNC: 11 U/L (ref 1–41)
ALT SERPL W P-5'-P-CCNC: 13 U/L (ref 1–41)
ANION GAP SERPL CALCULATED.3IONS-SCNC: 11.2 MMOL/L (ref 5–15)
ANION GAP SERPL CALCULATED.3IONS-SCNC: 14.2 MMOL/L (ref 5–15)
AST SERPL-CCNC: 15 U/L (ref 1–40)
AST SERPL-CCNC: 20 U/L (ref 1–40)
BASOPHILS # BLD AUTO: 0.07 10*3/MM3 (ref 0–0.2)
BASOPHILS NFR BLD AUTO: 0.7 % (ref 0–1.5)
BILIRUB SERPL-MCNC: 0.3 MG/DL (ref 0–1.2)
BILIRUB SERPL-MCNC: 0.4 MG/DL (ref 0.2–1.2)
BUN BLD-MCNC: 12 MG/DL (ref 8–23)
BUN SERPL-MCNC: 11 MG/DL (ref 8–23)
BUN/CREAT SERPL: 10.3 (ref 7–25)
BUN/CREAT SERPL: 10.9 (ref 7–25)
CALCIUM SPEC-SCNC: 9.8 MG/DL (ref 8.6–10.5)
CALCIUM SPEC-SCNC: 9.8 MG/DL (ref 8.6–10.5)
CHLORIDE SERPL-SCNC: 103 MMOL/L (ref 98–107)
CHLORIDE SERPL-SCNC: 107 MMOL/L (ref 98–107)
CHOLEST SERPL-MCNC: 122 MG/DL (ref 0–200)
CHOLEST SERPL-MCNC: 124 MG/DL (ref 0–200)
CO2 SERPL-SCNC: 21.8 MMOL/L (ref 22–29)
CO2 SERPL-SCNC: 23.8 MMOL/L (ref 22–29)
CREAT BLD-MCNC: 1.16 MG/DL (ref 0.76–1.27)
CREAT SERPL-MCNC: 1.01 MG/DL (ref 0.76–1.27)
CREAT UR-MCNC: 83.8 MG/DL
DEPRECATED RDW RBC AUTO: 46.9 FL (ref 37–54)
DEPRECATED RDW RBC AUTO: 51.8 FL (ref 37–54)
EOSINOPHIL # BLD AUTO: 0.42 10*3/MM3 (ref 0–0.4)
EOSINOPHIL # BLD MANUAL: 0.34 10*3/MM3 (ref 0–0.4)
EOSINOPHIL NFR BLD AUTO: 4.2 % (ref 0.3–6.2)
EOSINOPHIL NFR BLD MANUAL: 3 % (ref 0.3–6.2)
ERYTHROCYTE [DISTWIDTH] IN BLOOD BY AUTOMATED COUNT: 13.6 % (ref 12.3–15.4)
ERYTHROCYTE [DISTWIDTH] IN BLOOD BY AUTOMATED COUNT: 13.7 % (ref 12.3–15.4)
GFR SERPL CREATININE-BSD FRML MDRD: 61 ML/MIN/1.73
GFR SERPL CREATININE-BSD FRML MDRD: 72 ML/MIN/1.73
GLOBULIN UR ELPH-MCNC: 2.3 GM/DL
GLOBULIN UR ELPH-MCNC: 3 GM/DL
GLUCOSE BLD-MCNC: 102 MG/DL (ref 65–99)
GLUCOSE SERPL-MCNC: 95 MG/DL (ref 65–99)
HBA1C MFR BLD: 6.24 % (ref 4.8–5.6)
HBA1C MFR BLD: 6.47 % (ref 4.8–5.6)
HCT VFR BLD AUTO: 45.1 % (ref 37.5–51)
HCT VFR BLD AUTO: 47.1 % (ref 37.5–51)
HCV AB SER DONR QL: NORMAL
HDLC SERPL-MCNC: 42 MG/DL (ref 40–60)
HDLC SERPL-MCNC: 43 MG/DL (ref 40–60)
HGB BLD-MCNC: 14.8 G/DL (ref 13–17.7)
HGB BLD-MCNC: 14.9 G/DL (ref 13–17.7)
IMM GRANULOCYTES # BLD AUTO: 0.06 10*3/MM3 (ref 0–0.05)
IMM GRANULOCYTES NFR BLD AUTO: 0.6 % (ref 0–0.5)
LDLC SERPL CALC-MCNC: 58 MG/DL (ref 0–100)
LDLC SERPL CALC-MCNC: 63 MG/DL (ref 0–100)
LDLC/HDLC SERPL: 1.34 {RATIO}
LDLC/HDLC SERPL: 1.51 {RATIO}
LYMPHOCYTES # BLD AUTO: 3.52 10*3/MM3 (ref 0.7–3.1)
LYMPHOCYTES # BLD MANUAL: 4.18 10*3/MM3 (ref 0.7–3.1)
LYMPHOCYTES NFR BLD AUTO: 35.2 % (ref 19.6–45.3)
LYMPHOCYTES NFR BLD MANUAL: 1 % (ref 5–12)
LYMPHOCYTES NFR BLD MANUAL: 37 % (ref 19.6–45.3)
MCH RBC QN AUTO: 31.2 PG (ref 26.6–33)
MCH RBC QN AUTO: 32 PG (ref 26.6–33)
MCHC RBC AUTO-ENTMCNC: 31.6 G/DL (ref 31.5–35.7)
MCHC RBC AUTO-ENTMCNC: 32.8 G/DL (ref 31.5–35.7)
MCV RBC AUTO: 101.3 FL (ref 79–97)
MCV RBC AUTO: 94.9 FL (ref 79–97)
MICROALBUMIN/CREAT UR: 25.1 MG/G
MONOCYTES # BLD AUTO: 0.11 10*3/MM3 (ref 0.1–0.9)
MONOCYTES # BLD AUTO: 0.58 10*3/MM3 (ref 0.1–0.9)
MONOCYTES NFR BLD AUTO: 5.8 % (ref 5–12)
MYELOCYTES NFR BLD MANUAL: 1 % (ref 0–0)
NEUTROPHILS # BLD AUTO: 6.32 10*3/MM3 (ref 1.7–7)
NEUTROPHILS NFR BLD AUTO: 5.34 10*3/MM3 (ref 1.7–7)
NEUTROPHILS NFR BLD AUTO: 53.5 % (ref 42.7–76)
NEUTROPHILS NFR BLD MANUAL: 56 % (ref 42.7–76)
NRBC BLD AUTO-RTO: 0 /100 WBC (ref 0–0.2)
NRBC SPEC MANUAL: 1 /100 WBC (ref 0–0.2)
PLAT MORPH BLD: NORMAL
PLATELET # BLD AUTO: 206 10*3/MM3 (ref 140–450)
PLATELET # BLD AUTO: 213 10*3/MM3 (ref 140–450)
PMV BLD AUTO: 12.5 FL (ref 6–12)
PMV BLD AUTO: 12.9 FL (ref 6–12)
POTASSIUM BLD-SCNC: 5 MMOL/L (ref 3.5–5.2)
POTASSIUM SERPL-SCNC: 4.5 MMOL/L (ref 3.5–5.2)
PROT SERPL-MCNC: 6.3 G/DL (ref 6–8.5)
PROT SERPL-MCNC: 7.5 G/DL (ref 6–8.5)
PSA SERPL-MCNC: 0.62 NG/ML (ref 0–4)
RBC # BLD AUTO: 4.65 10*6/MM3 (ref 4.14–5.8)
RBC # BLD AUTO: 4.75 10*6/MM3 (ref 4.14–5.8)
RBC MORPH BLD: NORMAL
SODIUM BLD-SCNC: 139 MMOL/L (ref 136–145)
SODIUM SERPL-SCNC: 142 MMOL/L (ref 136–145)
TRIGL SERPL-MCNC: 116 MG/DL (ref 0–150)
TRIGL SERPL-MCNC: 83 MG/DL (ref 0–150)
TSH SERPL DL<=0.05 MIU/L-ACNC: 2.58 UIU/ML (ref 0.27–4.2)
VARIANT LYMPHS NFR BLD MANUAL: 2 % (ref 0–5)
VIT B12 BLD-MCNC: 367 PG/ML (ref 211–946)
VLDLC SERPL-MCNC: 16.6 MG/DL (ref 5–40)
VLDLC SERPL-MCNC: 23.2 MG/DL (ref 5–40)
VZV IGG SER IA-ACNC: POSITIVE
WBC # BLD AUTO: 9.99 10*3/MM3 (ref 3.4–10.8)
WBC MORPH BLD: NORMAL
WBC NRBC COR # BLD: 11.29 10*3/MM3 (ref 3.4–10.8)

## 2020-01-01 PROCEDURE — 93280 PM DEVICE PROGR EVAL DUAL: CPT | Performed by: NURSE PRACTITIONER

## 2020-01-01 PROCEDURE — 82607 VITAMIN B-12: CPT

## 2020-01-01 PROCEDURE — 82570 ASSAY OF URINE CREATININE: CPT

## 2020-01-01 PROCEDURE — 85025 COMPLETE CBC W/AUTO DIFF WBC: CPT

## 2020-01-01 PROCEDURE — 86803 HEPATITIS C AB TEST: CPT

## 2020-01-01 PROCEDURE — 90694 VACC AIIV4 NO PRSRV 0.5ML IM: CPT | Performed by: PHYSICIAN ASSISTANT

## 2020-01-01 PROCEDURE — 99407 BEHAV CHNG SMOKING > 10 MIN: CPT | Performed by: INTERNAL MEDICINE

## 2020-01-01 PROCEDURE — 83036 HEMOGLOBIN GLYCOSYLATED A1C: CPT

## 2020-01-01 PROCEDURE — 80061 LIPID PANEL: CPT

## 2020-01-01 PROCEDURE — 99214 OFFICE O/P EST MOD 30 MIN: CPT | Performed by: NURSE PRACTITIONER

## 2020-01-01 PROCEDURE — 82043 UR ALBUMIN QUANTITATIVE: CPT

## 2020-01-01 PROCEDURE — 99214 OFFICE O/P EST MOD 30 MIN: CPT | Performed by: PHYSICIAN ASSISTANT

## 2020-01-01 PROCEDURE — 80053 COMPREHEN METABOLIC PANEL: CPT

## 2020-01-01 PROCEDURE — 86787 VARICELLA-ZOSTER ANTIBODY: CPT

## 2020-01-01 PROCEDURE — G0439 PPPS, SUBSEQ VISIT: HCPCS | Performed by: PHYSICIAN ASSISTANT

## 2020-01-01 PROCEDURE — G0103 PSA SCREENING: HCPCS

## 2020-01-01 PROCEDURE — 85007 BL SMEAR W/DIFF WBC COUNT: CPT

## 2020-01-01 PROCEDURE — G0008 ADMIN INFLUENZA VIRUS VAC: HCPCS | Performed by: PHYSICIAN ASSISTANT

## 2020-01-01 PROCEDURE — 84443 ASSAY THYROID STIM HORMONE: CPT

## 2020-01-01 PROCEDURE — 99214 OFFICE O/P EST MOD 30 MIN: CPT | Performed by: INTERNAL MEDICINE

## 2020-01-01 RX ORDER — IPRATROPIUM BROMIDE AND ALBUTEROL SULFATE 2.5; .5 MG/3ML; MG/3ML
3 SOLUTION RESPIRATORY (INHALATION)
Qty: 360 ML | Refills: 0 | Status: SHIPPED | OUTPATIENT
Start: 2020-01-01

## 2020-01-01 RX ORDER — RAMIPRIL 2.5 MG/1
2.5 CAPSULE ORAL DAILY
Qty: 90 CAPSULE | Refills: 1 | Status: SHIPPED | OUTPATIENT
Start: 2020-01-01 | End: 2020-01-01 | Stop reason: SDUPTHER

## 2020-01-01 RX ORDER — ALBUTEROL SULFATE 90 UG/1
2 AEROSOL, METERED RESPIRATORY (INHALATION) EVERY 6 HOURS
Qty: 1 INHALER | Refills: 11 | Status: SHIPPED | OUTPATIENT
Start: 2020-01-01 | End: 2020-01-01

## 2020-01-01 RX ORDER — CLOPIDOGREL BISULFATE 75 MG/1
75 TABLET ORAL DAILY
Qty: 90 TABLET | Refills: 1 | Status: SHIPPED | OUTPATIENT
Start: 2020-01-01 | End: 2020-01-01 | Stop reason: SDUPTHER

## 2020-01-01 RX ORDER — ROSUVASTATIN CALCIUM 40 MG/1
40 TABLET, COATED ORAL DAILY
Qty: 90 TABLET | Refills: 1 | Status: SHIPPED | OUTPATIENT
Start: 2020-01-01

## 2020-01-01 RX ORDER — IPRATROPIUM BROMIDE AND ALBUTEROL SULFATE 2.5; .5 MG/3ML; MG/3ML
3 SOLUTION RESPIRATORY (INHALATION) EVERY 4 HOURS PRN
Qty: 360 ML | Refills: 1 | Status: SHIPPED | OUTPATIENT
Start: 2020-01-01 | End: 2020-01-01

## 2020-01-01 RX ORDER — LEVOCETIRIZINE DIHYDROCHLORIDE 5 MG/1
5 TABLET, FILM COATED ORAL EVERY EVENING
Qty: 30 TABLET | Refills: 3 | Status: SHIPPED | OUTPATIENT
Start: 2020-01-01

## 2020-01-01 RX ORDER — RAMIPRIL 2.5 MG/1
2.5 CAPSULE ORAL DAILY
Qty: 90 CAPSULE | Refills: 1 | Status: SHIPPED | OUTPATIENT
Start: 2020-01-01

## 2020-01-01 RX ORDER — IPRATROPIUM BROMIDE AND ALBUTEROL SULFATE 2.5; .5 MG/3ML; MG/3ML
SOLUTION RESPIRATORY (INHALATION)
Qty: 360 ML | Refills: 0 | Status: SHIPPED | OUTPATIENT
Start: 2020-01-01 | End: 2020-01-01 | Stop reason: SDUPTHER

## 2020-01-01 RX ORDER — ROSUVASTATIN CALCIUM 40 MG/1
40 TABLET, COATED ORAL DAILY
Qty: 90 TABLET | Refills: 1 | Status: SHIPPED | OUTPATIENT
Start: 2020-01-01 | End: 2020-01-01 | Stop reason: SDUPTHER

## 2020-01-01 RX ORDER — CLOPIDOGREL BISULFATE 75 MG/1
75 TABLET ORAL DAILY
Qty: 90 TABLET | Refills: 1 | Status: SHIPPED | OUTPATIENT
Start: 2020-01-01

## 2020-01-01 RX ORDER — ALBUTEROL SULFATE 90 UG/1
2 AEROSOL, METERED RESPIRATORY (INHALATION) EVERY 4 HOURS PRN
Qty: 3 INHALER | Refills: 11 | Status: SHIPPED | OUTPATIENT
Start: 2020-01-01

## 2020-01-01 RX ORDER — PANTOPRAZOLE SODIUM 40 MG/1
40 TABLET, DELAYED RELEASE ORAL DAILY
Qty: 90 TABLET | Refills: 3 | Status: SHIPPED | OUTPATIENT
Start: 2020-01-01

## 2020-01-13 NOTE — TELEPHONE ENCOUNTER
REFERRED BY ESMER HARPER ( )     *PREVIOUS CARDIOLOGIST: DR. CASTELAN (REC IN MEDIA TAB 11/21/17-12/18/17)  *TESTING- SCANNED IN CARDIOLOGY TAB 2017

## 2020-01-20 PROBLEM — J44.9 COPD (CHRONIC OBSTRUCTIVE PULMONARY DISEASE) (HCC): Status: RESOLVED | Noted: 2018-01-18 | Resolved: 2020-01-01

## 2020-01-20 NOTE — PROGRESS NOTES
Chief Complaint   Patient presents with   • COPD     Pt is here for a follow up and is feeling better       HPI     Ceasar Woodard is a pleasant 74 y.o. male who is here for routine follow-up of COPD exacerbation, cardiovascular disease, tobacco abuse, history of skin cancer and new onset diabetes.  Patient reports improvement in recent COPD exacerbation.  Using trelegy inhaler daily and feels this is beneficial.  Has home nebulizer machine now.  Denies cough or worsening shortness of breath today.  Continues to smoke, has no desire to quit.  Currently followed by cardiology at [facility] and wants to transfer care to [facility] to keep all of his providers at one location.  Pending appointment with electrophysiology for pacemaker.  Has appointment with dermatology in 2 days for skin exam.  Overall patient has no complaints today and is doing well.  Patient's wife is present during appointment.     Past Medical History:   Diagnosis Date   • AAA (abdominal aortic aneurysm) (CMS/[facility])    • CAD (coronary artery disease)    • Cancer (CMS/[facility])    • COPD (chronic obstructive pulmonary disease) (CMS/[facility])    • HTN (hypertension)    • Hyperlipidemia    • Hypertension    • Lung nodule    • PAD (peripheral artery disease) (CMS/[facility])    • Stroke (CMS/[facility])        Past Surgical History:   Procedure Laterality Date   • ABDOMINAL AORTIC ANEURYSM REPAIR     • ABDOMINAL SURGERY  06/12/2019   • CORONARY ANGIOPLASTY WITH STENT PLACEMENT     • CORONARY ARTERY BYPASS GRAFT     • PACEMAKER IMPLANTATION     • SKIN GRAFT     • SQUAMOUS CELL CARCINOMA EXCISION         Family History   Problem Relation Age of Onset   • COPD Mother    • Alzheimer's disease Mother    • Stroke Father    • Heart attack Father    • COPD Father        Social History     Socioeconomic History   • Marital status:      Spouse name: Not on file   • Number of children: Not on file   • Years of education: Not on file   • Highest education level: Not on file   Tobacco Use   •  "Smoking status: Current Every Day Smoker     Packs/day: 2.00     Years: 61.00     Pack years: 122.00     Types: Cigarettes     Start date: 1959   • Smokeless tobacco: Never Used   Substance and Sexual Activity   • Alcohol use: Yes     Alcohol/week: 4.0 standard drinks     Types: 4 Shots of liquor per week     Comment: bourbon   • Drug use: No   • Sexual activity: Defer       No Known Allergies    ROS    Review of Systems   Constitutional: Positive for fatigue. Negative for chills, diaphoresis and fever.   HENT: Negative for congestion, postnasal drip and rhinorrhea.    Respiratory: Positive for cough, shortness of breath and wheezing.         Cough, SOB and wheezing are baseline today   Cardiovascular: Negative for chest pain and leg swelling.   Neurological: Negative for dizziness and headache.   Psychiatric/Behavioral: Negative for self-injury, sleep disturbance, suicidal ideas, depressed mood and stress. The patient is not nervous/anxious.        Vitals:    01/20/20 1108   BP: 118/86   Pulse: 84   SpO2: 97%   Weight: 85.5 kg (188 lb 9.6 oz)   Height: 177.8 cm (70\")     Body mass index is 27.06 kg/m².    Current Outpatient Medications on File Prior to Visit   Medication Sig Dispense Refill   • aspirin 81 MG tablet Take 81 mg by mouth Daily.     • clopidogrel (PLAVIX) 75 MG tablet Take 1 tablet by mouth Daily. 90 tablet 1   • diclofenac (VOLTAREN) 1 % gel gel Apply 4 g topically to the appropriate area as directed 4 (Four) Times a Day As Needed (prn for pain). 60 tube 0   • Fluticasone-Umeclidin-Vilant (TRELEGY ELLIPTA) 100-62.5-25 MCG/INH aerosol powder  Take 1 puff by mouth Daily. 28 each 5   • ipratropium-albuterol (DUO-NEB) 0.5-2.5 mg/3 ml nebulizer Take 3 mL by nebulization Every 4 (Four) Hours As Needed for Wheezing. 360 mL 1   • metoprolol tartrate (LOPRESSOR) 25 MG tablet Take 1 tablet by mouth Daily. 90 tablet 1   • nitroglycerin (NITROSTAT) 0.4 MG SL tablet Place 1 tablet under the tongue Every 5 (Five) " Minutes As Needed for Chest Pain. Take no more than 3 doses in 15 minutes. 30 tablet 1   • pantoprazole (PROTONIX) 40 MG EC tablet Take 1 tablet by mouth Daily. 90 tablet 3   • ramipril (ALTACE) 2.5 MG capsule Take 1 capsule by mouth Daily. 90 capsule 1   • rosuvastatin (CRESTOR) 40 MG tablet Take 1 tablet by mouth Daily. 90 tablet 1   • VENTOLIN  (90 Base) MCG/ACT inhaler inhale 2 puffs by mouth every 6 hours  0     Current Facility-Administered Medications on File Prior to Visit   Medication Dose Route Frequency Provider Last Rate Last Dose   • ipratropium-albuterol (DUO-NEB) nebulizer solution 3 mL  3 mL Nebulization 4x Daily - RT Prema Frias PA-C   3 mL at 12/23/19 1126       Results for orders placed or performed in visit on 01/17/20   Comprehensive Metabolic Panel   Result Value Ref Range    Glucose 102 (H) 65 - 99 mg/dL    BUN 12 8 - 23 mg/dL    Creatinine 1.16 0.76 - 1.27 mg/dL    Sodium 139 136 - 145 mmol/L    Potassium 5.0 3.5 - 5.2 mmol/L    Chloride 103 98 - 107 mmol/L    CO2 21.8 (L) 22.0 - 29.0 mmol/L    Calcium 9.8 8.6 - 10.5 mg/dL    Total Protein 7.5 6.0 - 8.5 g/dL    Albumin 4.50 3.50 - 5.20 g/dL    ALT (SGPT) 13 1 - 41 U/L    AST (SGOT) 20 1 - 40 U/L    Alkaline Phosphatase 146 (H) 39 - 117 U/L    Total Bilirubin 0.4 0.2 - 1.2 mg/dL    eGFR Non African Amer 61 >60 mL/min/1.73    Globulin 3.0 gm/dL    A/G Ratio 1.5 g/dL    BUN/Creatinine Ratio 10.3 7.0 - 25.0    Anion Gap 14.2 5.0 - 15.0 mmol/L   Hemoglobin A1c   Result Value Ref Range    Hemoglobin A1C 6.47 (H) 4.80 - 5.60 %   Lipid Panel   Result Value Ref Range    Total Cholesterol 124 0 - 200 mg/dL    Triglycerides 116 0 - 150 mg/dL    HDL Cholesterol 43 40 - 60 mg/dL    LDL Cholesterol  58 0 - 100 mg/dL    VLDL Cholesterol 23.2 5 - 40 mg/dL    LDL/HDL Ratio 1.34    Varicella Zoster Antibody, IgG   Result Value Ref Range    Varicella IgG Positive Positive, Equivocal   CBC Auto Differential   Result Value Ref Range    WBC  11.29 (H) 3.40 - 10.80 10*3/mm3    RBC 4.75 4.14 - 5.80 10*6/mm3    Hemoglobin 14.8 13.0 - 17.7 g/dL    Hematocrit 45.1 37.5 - 51.0 %    MCV 94.9 79.0 - 97.0 fL    MCH 31.2 26.6 - 33.0 pg    MCHC 32.8 31.5 - 35.7 g/dL    RDW 13.6 12.3 - 15.4 %    RDW-SD 46.9 37.0 - 54.0 fl    MPV 12.9 (H) 6.0 - 12.0 fL    Platelets 213 140 - 450 10*3/mm3   Vitamin B12   Result Value Ref Range    Vitamin B-12 367 211 - 946 pg/mL   Manual Differential   Result Value Ref Range    Neutrophil % 56.0 42.7 - 76.0 %    Lymphocyte % 37.0 19.6 - 45.3 %    Monocyte % 1.0 (L) 5.0 - 12.0 %    Eosinophil % 3.0 0.3 - 6.2 %    Myelocyte % 1.0 (H) 0.0 - 0.0 %    Atypical Lymphocyte % 2.0 0.0 - 5.0 %    Neutrophils Absolute 6.32 1.70 - 7.00 10*3/mm3    Lymphocytes Absolute 4.18 (H) 0.70 - 3.10 10*3/mm3    Monocytes Absolute 0.11 0.10 - 0.90 10*3/mm3    Eosinophils Absolute 0.34 0.00 - 0.40 10*3/mm3    nRBC 1.0 (H) 0.0 - 0.2 /100 WBC    RBC Morphology Normal Normal    WBC Morphology Normal Normal    Platelet Morphology Normal Normal       PE    Physical Exam   Constitutional: Vital signs are normal. He appears well-developed and well-nourished. He is active and cooperative. He has a sickly appearance. No distress. He appears overweight.   HENT:   Head: Normocephalic and atraumatic.   Eyes: EOM are normal.   Neck: Normal range of motion.   Cardiovascular: Normal rate, regular rhythm and normal heart sounds.   Pulmonary/Chest: Effort normal. He has decreased breath sounds. He has no wheezes. He has no rhonchi. He has no rales.   Musculoskeletal: Normal range of motion. He exhibits no edema.   Neurological: He is alert.   Skin: Skin is warm. He is not diaphoretic. No erythema.   Psychiatric: He has a normal mood and affect. His speech is normal and behavior is normal. Judgment and thought content normal. He is not actively hallucinating. He is attentive.       KIKE/P    Ceasar was seen today for copd.    Diagnoses and all orders for this visit:    Coronary  artery disease involving native heart without angina pectoris, unspecified vessel or lesion type  Complete heart block (CMS/[facility])  Coronary arteriosclerosis in native artery  -     Ambulatory Referral to Cardiology  Pending appointment with electrophysiology at [facility] in May.  Patient is currently going to cardiology at [facility], Dr. Lopez.  He requests that he is transferred to a cardiologist at [facility], wants to see all providers at [facility].    Type 2 diabetes mellitus with hyperglycemia, without long-term current use of insulin (CMS/[facility])  -     metFORMIN (GLUCOPHAGE) 500 MG tablet; Take 1 tablet at night with dinner, increase to 2 tablets at night if tolerating  Recent hemoglobin AIC was 6.47%  New onset diabetes diagnosis, discussed importance of diet and avoiding sugar, alcohol and limiting carbohydrates.  Will start patient on metformin 500 mg at night, increase to 1000 mg if tolerating.  Return in 3 months for repeat hemoglobin AIC and referral to podiatry at that time.    PVD (peripheral vascular disease) (CMS/[facility])  Denies any claudication, paresthesia or pain.  Discussed impact this will have on diabetes and foot health.    Squamous cell carcinoma of head and neck (CMS/[facility])  Has appointment with dermatology in 2 days.       Plan of care reviewed with patient at the conclusion of today's visit. Education was provided regarding diagnosis, management and any prescribed or recommended OTC medications.  Patient verbalizes understanding of and agreement with management plan.    Return in about 4 months (around 5/20/2020) for Medicare Wellness.     Prema Frias PA-C

## 2020-03-06 NOTE — TELEPHONE ENCOUNTER
With patient's health history and smoking, recommend patient be seen at [facility] in [place].

## 2020-03-06 NOTE — TELEPHONE ENCOUNTER
PATIENTS WIFE CALLED STATING PATIENT HAS A BAD COUGH, AND WHEEZING . PATIENTS USING  A NEBULIZAR BUT WOULD LIKE TO REQUEST A MEDICATION TO CLEAR UP HIS COUGH..  PATIENT AND WIFE ARE IN [place] AND WOULD LIKE MEDICATION SENT TO [facility] IN [place]  [phone]

## 2020-04-08 NOTE — TELEPHONE ENCOUNTER
Contacted pharmacy they are requesting to dispense 32 dosages for 90 day quantity with 2 refills. She verfied the prescriber and stated they had all they needed to send out the order.

## 2020-04-08 NOTE — TELEPHONE ENCOUNTER
"Yadira from [facility] is requesting call back for clarification on a medication.     Ashley West  \"Quantity or dosing-90 day supply\"    Please call pharmacy back to clarify.  [phone]    REF# [id number]  "

## 2020-05-29 PROBLEM — F10.10 ETOH ABUSE: Status: ACTIVE | Noted: 2020-01-01

## 2020-05-29 NOTE — PROGRESS NOTES
Cardiac Electrophysiology Outpatient Consult Note            [facility]     Consult Note     Ceasar Woodard  [id number]  05/29/2020       Primary Care Physician: Prema Frias PA-C    Referred By: Prema Frias,*    Subjective     Chief Complaint: Complete heart block status post dual-chamber pacemaker implant      History of Present Illness:   Mr. Ceasar Woodard is a 75 y.o. male who presents to my electrophysiology clinic to establish care.  He is a former patient of Dr. Enrrique Matias at [facility] who is no longer in practice there.  Mr. Woodard has a past medical history significant for complete heart block status post Medtronic dual-chamber pacemaker implant, CVA, CAD status post CABG, hypertension, dyslipidemia, diabetes, tobacco abuse, and EtOH abuse.  Upon presentation today he reports he has been doing well overall from a cardiac standpoint.  He denies chest pain, palpitations, dizziness, presyncope, syncope, or TIA/strokelike symptoms.  He is compliant with his current medication therapy without noted side effects.  His last recorded EF was 55-60% and 2017.  He does admit to fatigue, shortness of breath, and dyspnea on minimal exertion that he associates with his COPD and 1 pack/day history of tobacco abuse.  He admits to 2 mixed drinks of [employer] daily.  He denies a history of sleep apnea or associated signs and symptoms.    Review of Systems:   Constitutional: No fevers or chills, no recent weight gain or weight loss or fatigue  Eyes: No visual loss, blurred vision, double vision, yellow sclerae.  ENT: No headaches, hearing loss, vertigo, congestion or sore throat.   Cardiovascular: Per HPI  Respiratory: No cough or wheezing, no sputum production, no hematemesis   Gastrointestinal: No abdominal pain, no nausea, vomiting, constipation, diarrhea, melena.   Genitourinary: No dysuria, hematuria or increased frequency.  Musculoskeletal:  No gait  disturbance, weakness or joint pain or stiffness  Integumentary: No rashes, urticaria, ulcers or sores.   Neurological: No headache, dizziness, syncope, paralysis, ataxia, no prior CVA/TIA  Psychiatric: No anxiety, or depression  Endocrine: No diaphoresis, cold or heat intolerance. No polyuria or polydipsia.   Hematologic/Lymphatic: No anemia, abnormal bruising or bleeding. No history of DVT/PE.        Past Medical History:   Past Medical History:   Diagnosis Date   • AAA (abdominal aortic aneurysm) (CMS/[facility])    • CAD (coronary artery disease)    • Cancer (CMS/[facility])    • COPD (chronic obstructive pulmonary disease) (CMS/[facility])    • HTN (hypertension)    • Hyperlipidemia    • Hypertension    • Lung nodule    • PAD (peripheral artery disease) (CMS/[facility])    • Stroke (CMS/[facility])        Past Surgical History:   Past Surgical History:   Procedure Laterality Date   • ABDOMINAL AORTIC ANEURYSM REPAIR     • ABDOMINAL SURGERY  06/12/2019   • CORONARY ANGIOPLASTY WITH STENT PLACEMENT     • CORONARY ARTERY BYPASS GRAFT     • PACEMAKER IMPLANTATION     • SKIN GRAFT     • SQUAMOUS CELL CARCINOMA EXCISION         Family History:   Family History   Problem Relation Age of Onset   • COPD Mother    • Alzheimer's disease Mother    • Stroke Father    • Heart attack Father    • COPD Father        Social History:   Social History     Socioeconomic History   • Marital status:      Spouse name: Not on file   • Number of children: Not on file   • Years of education: Not on file   • Highest education level: Not on file   Tobacco Use   • Smoking status: Current Every Day Smoker     Packs/day: 2.00     Years: 61.00     Pack years: 122.00     Types: Cigarettes     Start date: 1959   • Smokeless tobacco: Never Used   Substance and Sexual Activity   • Alcohol use: Yes     Alcohol/week: 4.0 standard drinks     Types: 4 Shots of liquor per week     Comment: bourbon   • Drug use: No   • Sexual activity: Defer       Medications:     Current  "Outpatient Medications:   •  aspirin 81 MG tablet, Take 81 mg by mouth Daily., Disp: , Rfl:   •  clopidogrel (PLAVIX) 75 MG tablet, Take 1 tablet by mouth Daily., Disp: 90 tablet, Rfl: 1  •  diclofenac (VOLTAREN) 1 % gel gel, Apply 4 g topically to the appropriate area as directed 4 (Four) Times a Day As Needed (prn for pain)., Disp: 60 tube, Rfl: 0  •  Fluticasone-Umeclidin-Vilant (Trelegy Ellipta) 100-62.5-25 MCG/INH aerosol powder , Take 1 puff by mouth Daily., Disp: 28 each, Rfl: 5  •  ipratropium-albuterol (DUO-NEB) 0.5-2.5 mg/3 ml nebulizer, inhale contents of ONE vial in nebulizer EVERY 4 HOURS AS NEEDED FOR wheezing, Disp: 360 mL, Rfl: 0  •  metFORMIN (Glucophage) 500 MG tablet, Take 1 tablet at night with dinner, increase to 2 tablets at night if tolerating, Disp: 60 tablet, Rfl: 3  •  metoprolol tartrate (LOPRESSOR) 25 MG tablet, Take 1 tablet by mouth Daily., Disp: 90 tablet, Rfl: 1  •  nitroglycerin (NITROSTAT) 0.4 MG SL tablet, Place 1 tablet under the tongue Every 5 (Five) Minutes As Needed for Chest Pain. Take no more than 3 doses in 15 minutes., Disp: 30 tablet, Rfl: 1  •  pantoprazole (PROTONIX) 40 MG EC tablet, Take 1 tablet by mouth Daily., Disp: 90 tablet, Rfl: 3  •  ramipril (ALTACE) 2.5 MG capsule, Take 1 capsule by mouth Daily., Disp: 90 capsule, Rfl: 1  •  rosuvastatin (CRESTOR) 40 MG tablet, Take 1 tablet by mouth Daily., Disp: 90 tablet, Rfl: 1  •  VENTOLIN  (90 Base) MCG/ACT inhaler, inhale 2 puffs by mouth every 6 hours, Disp: , Rfl: 0    Allergies:   No Known Allergies    Objective     Physical Exam:  Vital Signs:   Vitals:    05/29/20 0945   BP: 102/60   BP Location: Left arm   Patient Position: Sitting   Pulse: 63   Weight: 84.4 kg (186 lb)   Height: 180.3 cm (71\")     GEN: Well nourished, well-developed, no acute distress  HEENT: Normocephalic, atraumatic, moist mucous membranes  NECK: Supple, no JVD, no thyromegaly, no lymphadenopathy  CARD: Regular rate and rhythm, normal S1 " & S2 are present.  No murmur, gallop or rubs are appreciated.  LUNGS: Clear to auscultation bilateraly, normal respiratory effort  ABDOMEN: Soft, nontender, normal bowel sounds  EXTREMITIES: No gross deformities, no clubbing, cyanosis.  No Edema   SKIN: Warm, dry  NEURO: No focal deficits, alert and oriented x 3  PSYCHIATRIC: Normal affect and mood, appropriate use of semantics and logic.      Lab Results   Component Value Date    GLUCOSE 102 (H) 01/17/2020    CALCIUM 9.8 01/17/2020     01/17/2020    K 5.0 01/17/2020    CO2 21.8 (L) 01/17/2020     01/17/2020    BUN 12 01/17/2020    CREATININE 1.16 01/17/2020    EGFRIFNONA 61 01/17/2020    BCR 10.3 01/17/2020    ANIONGAP 14.2 01/17/2020     Lab Results   Component Value Date    WBC 11.29 (H) 01/17/2020    HGB 14.8 01/17/2020    HCT 45.1 01/17/2020    MCV 94.9 01/17/2020     01/17/2020     No results found for: INR, PROTIME  Lab Results   Component Value Date    TSH 2.120 05/07/2019       Cardiac Testing:      I personally viewed and interpreted the patient's EKG/Telemetry/lab data      ECG 12 Lead  Date/Time: 5/29/2020 10:39 AM  Performed by: Jovan Balderas APRN  Authorized by: Jovan Balderas APRN   Comparison: not compared with previous ECG   Previous ECG: no previous ECG available  Rhythm: paced  BPM: 64            Device check: Medtronic dual-chamber pacemaker at DDDR 60 bpm, RA paced 90%, RV paced 90%, acceptable lead threshold and teens, battery voltage with 7 years remaining.  No events noted.    Assessment & Plan      Ceasar was seen today for coronary artery disease.    Diagnoses and all orders for this visit:    Complete heart block (CMS/HCC)    Coronary artery disease involving native heart without angina pectoris, unspecified vessel or lesion type    Cerebrovascular accident (CVA), unspecified mechanism (CMS/HCC)    Pacemaker    Tobacco dependence    Essential hypertension    Hyperlipidemia, unspecified hyperlipidemia  type    ETOH abuse    Other orders  -     ECG 12 Lead    Plan: Mr. hollingsworth is seen in consultation today to establish care with EP due to a history of complete heart block status post pacemaker implant with his prior physician not in practice anymore.  He is stable and doing well from cardiac standpoint.  His device interrogation is acceptable today with 7 years remaining on his battery life.  He is instructed to reschedule canceled appointments with Dr. Mathew and Maryann Arredondo as previously established.  We will continue to follow patient on a as needed basis.  Thank you for the consultation.        Follow Up: As needed      Thank you for allowing me to participate in the care of your patient. Please to not hesitate to contact me with additional questions or concerns.        Electronically signed by CAROLINE Kemp, 05/29/20, 10:39 AM.

## 2020-06-26 PROBLEM — C44.609 SKIN CANCER OF ARM, LEFT: Status: ACTIVE | Noted: 2020-01-01

## 2020-06-26 NOTE — PROGRESS NOTES
The ABCs of the Annual Wellness Visit  Subsequent Medicare Wellness Visit    Chief Complaint   Patient presents with   • Diabetes       Subjective   History of Present Illness:  Ceasar Woodard is a 75 y.o. male who presents for a Subsequent Medicare Wellness Visit.  Patient presents for management of hypertension, hyperlipidemia, CAD, CVA, diabetes type 2, COPD, tobacco dependence and skin cancer.  Patient's wife is present at appointment today.  Patient is followed by cardiology and dermatology. Compliant on all medications.  Not going to podiatrist and would like referral.  No significant complaints or concerns today.  Needs to schedule colonoscopy and CT chest low dose.  Continues to smoke, no desire to quit.  Denies any depression, anxiety or insomnia.  Overall doing well.      HEALTH RISK ASSESSMENT    Recent Hospitalizations:  No hospitalization(s) within the last year.    Current Medical Providers:  Patient Care Team:  Prema Frias PA-C as PCP - General (Physician Assistant)  Prema Frias PA-C as PCP - Claims Attributed  Leif Cabello MD (Interventional Cardiology)    Smoking Status:  Social History     Tobacco Use   Smoking Status Current Every Day Smoker   • Packs/day: 2.00   • Years: 61.00   • Pack years: 122.00   • Types: Cigarettes   • Start date: 1959   Smokeless Tobacco Never Used       Alcohol Consumption:  Social History     Substance and Sexual Activity   Alcohol Use Yes   • Alcohol/week: 4.0 standard drinks   • Types: 4 Shots of liquor per week    Comment: piedad       Depression Screen:   PHQ-2/PHQ-9 Depression Screening 5/8/2019   Little interest or pleasure in doing things 0   Feeling down, depressed, or hopeless 0   Total Score 0       Fall Risk Screen:  STEADI Fall Risk Assessment has not been completed.    Health Habits and Functional and Cognitive Screening:  Functional & Cognitive Status 5/7/2019   Do you have difficulty preparing food and eating? No   Do you have  difficulty bathing yourself, getting dressed or grooming yourself? No   Do you have difficulty using the toilet? No   Do you have difficulty moving around from place to place? No   Do you have trouble with steps or getting out of a bed or a chair? No   Current Diet Well Balanced Diet   Dental Exam Not up to date   Eye Exam Not up to date   Exercise (times per week) 0 times per week   Current Exercise Activities Include None   Do you need help using the phone?  No   Are you deaf or do you have serious difficulty hearing?  Yes   Do you need help with transportation? No   Do you need help shopping? No   Do you need help preparing meals?  No   Do you need help with housework?  No   Do you need help with laundry? No   Do you need help taking your medications? No   Do you need help managing money? No   Do you ever drive or ride in a car without wearing a seat belt? No   Have you felt unusual stress, anger or loneliness in the last month? No   Who do you live with? Spouse   If you need help, do you have trouble finding someone available to you? No   Have you been bothered in the last four weeks by sexual problems? No   Do you have difficulty concentrating, remembering or making decisions? Yes         Does the patient have evidence of cognitive impairment? No    Asprin use counseling:Taking ASA appropriately as indicated    Age-appropriate Screening Schedule:  Refer to the list below for future screening recommendations based on patient's age, sex and/or medical conditions. Orders for these recommended tests are listed in the plan section. The patient has been provided with a written plan.    Health Maintenance   Topic Date Due   • URINE MICROALBUMIN  1945   • ZOSTER VACCINE (1 of 2) 03/23/1994   • DIABETIC EYE EXAM  01/18/2018   • COLONOSCOPY  01/18/2018   • HEMOGLOBIN A1C  07/17/2020   • INFLUENZA VACCINE  08/01/2020   • LIPID PANEL  01/17/2021   • TDAP/TD VACCINES (2 - Tdap) 01/20/2027          The following  portions of the patient's history were reviewed and updated as appropriate: allergies, current medications, past family history, past medical history, past social history, past surgical history and problem list.    Outpatient Medications Prior to Visit   Medication Sig Dispense Refill   • aspirin 81 MG tablet Take 81 mg by mouth Daily.     • nitroglycerin (NITROSTAT) 0.4 MG SL tablet Place 1 tablet under the tongue Every 5 (Five) Minutes As Needed for Chest Pain. Take no more than 3 doses in 15 minutes. 30 tablet 1   • pantoprazole (PROTONIX) 40 MG EC tablet Take 1 tablet by mouth Daily. 90 tablet 3   • clopidogrel (PLAVIX) 75 MG tablet Take 1 tablet by mouth Daily. 90 tablet 1   • diclofenac (VOLTAREN) 1 % gel gel Apply 4 g topically to the appropriate area as directed 4 (Four) Times a Day As Needed (prn for pain). 60 tube 0   • Fluticasone-Umeclidin-Vilant (Trelegy Ellipta) 100-62.5-25 MCG/INH aerosol powder  Take 1 puff by mouth Daily. 28 each 5   • ipratropium-albuterol (DUO-NEB) 0.5-2.5 mg/3 ml nebulizer inhale contents of ONE vial in nebulizer EVERY 4 HOURS AS NEEDED FOR wheezing 360 mL 0   • metFORMIN (Glucophage) 500 MG tablet Take 1 tablet at night with dinner, increase to 2 tablets at night if tolerating 60 tablet 3   • metoprolol tartrate (LOPRESSOR) 25 MG tablet Take 1 tablet by mouth Daily. 90 tablet 1   • ramipril (ALTACE) 2.5 MG capsule Take 1 capsule by mouth Daily. 90 capsule 1   • rosuvastatin (CRESTOR) 40 MG tablet Take 1 tablet by mouth Daily. 90 tablet 1   • VENTOLIN  (90 Base) MCG/ACT inhaler inhale 2 puffs by mouth every 6 hours  0     No facility-administered medications prior to visit.        Patient Active Problem List   Diagnosis   • CAD (coronary artery disease)   • CVA (cerebral vascular accident) (CMS/HCC)   • Tobacco dependence   • Complete heart block (CMS/HCC)   • Hyperlipidemia   • Hypertension   • PVD (peripheral vascular disease) (CMS/HCC)   • Squamous cell carcinoma of  "head and neck (CMS/HCC)   • Coronary arteriosclerosis in native artery   • Pacemaker   • Special screening for malignant neoplasms, colon   • Abdominal aortic aneurysm (AAA) 3.0 cm to 5.5 cm in diameter in male (CMS/HCC)   • ETOH abuse   • Skin cancer of arm, left       Advanced Care Planning:  ACP discussion was held with the patient during this visit. Patient has an advance directive (not in EMR), copy requested.    Review of Systems   Constitutional: Positive for fatigue. Negative for chills, diaphoresis and fever.   HENT: Negative for congestion, ear pain, hearing loss, postnasal drip, rhinorrhea and sore throat.    Eyes: Negative for blurred vision and pain.   Respiratory: Positive for shortness of breath. Negative for cough and wheezing.    Cardiovascular: Negative for chest pain and leg swelling.   Gastrointestinal: Negative for abdominal pain, blood in stool, constipation, diarrhea, nausea, vomiting and indigestion.   Endocrine: Negative for polyuria.   Genitourinary: Negative for dysuria, flank pain and hematuria.   Musculoskeletal: Negative for arthralgias, gait problem and myalgias.   Skin: Positive for color change and skin lesions. Negative for rash.   Neurological: Negative for dizziness and headache.   Psychiatric/Behavioral: Negative for self-injury, sleep disturbance, suicidal ideas, depressed mood and stress. The patient is not nervous/anxious.        Compared to one year ago, the patient feels his physical health is the same.  Compared to one year ago, the patient feels his mental health is the same.    Reviewed chart for potential of high risk medication in the elderly: yes  Reviewed chart for potential of harmful drug interactions in the elderly:yes    Objective         Vitals:    06/26/20 1256   BP: 118/70   Pulse: 61   SpO2: 97%   Weight: 83.1 kg (183 lb 4.8 oz)   Height: 180.3 cm (71\")       Body mass index is 25.57 kg/m².  Discussed the patient's BMI with him. The BMI is in the acceptable " range.    Physical Exam   Constitutional: He is oriented to person, place, and time. Vital signs are normal. He appears well-developed and well-nourished. He is active and cooperative. He has a sickly appearance. No distress. He is not overweight.  HENT:   Head: Normocephalic and atraumatic.   Right Ear: Hearing, tympanic membrane, external ear and ear canal normal.   Left Ear: Hearing, tympanic membrane, external ear and ear canal normal.   Nose: Nose normal. Right sinus exhibits no maxillary sinus tenderness and no frontal sinus tenderness. Left sinus exhibits no maxillary sinus tenderness and no frontal sinus tenderness.   Mouth/Throat: Uvula is midline, oropharynx is clear and moist and mucous membranes are normal.   Eyes: Conjunctivae, EOM and lids are normal.   Neck: Trachea normal, normal range of motion and phonation normal. No thyroid mass and no thyromegaly present.   Cardiovascular: Normal rate, regular rhythm and normal heart sounds.   Pulmonary/Chest: Effort normal. He has decreased breath sounds.   Abdominal: Soft. Normal appearance and bowel sounds are normal. He exhibits no distension. There is no tenderness. There is no rigidity, no guarding and no CVA tenderness.   Musculoskeletal: Normal range of motion. He exhibits no edema.   Lymphadenopathy:     He has no cervical adenopathy.        Right cervical: No superficial cervical adenopathy present.       Left cervical: No superficial cervical adenopathy present.   Neurological: He is alert and oriented to person, place, and time. He has normal strength and normal reflexes. Coordination and gait normal.   CN grossly intact   Skin: Skin is warm and intact. No rash noted. He is not diaphoretic. No cyanosis or erythema. Nails show no clubbing.   Psychiatric: He has a normal mood and affect. His speech is normal and behavior is normal. Judgment and thought content normal. He is not actively hallucinating. Cognition and memory are normal. He is attentive.    Vitals reviewed.            Assessment/Plan   Medicare Risks and Personalized Health Plan  CMS Preventative Services Quick Reference  Advance Directive Discussion  Cardiovascular risk  Colon Cancer Screening  Diabetic Lab Screening   Immunizations Discussed/Encouraged (specific immunizations; Influenza )  Lung Cancer Risk  Obesity/Overweight     The above risks/problems have been discussed with the patient.  Pertinent information has been shared with the patient in the After Visit Summary.  Follow up plans and orders are seen below in the Assessment/Plan Section.    Diagnoses and all orders for this visit:    1. Medicare annual wellness visit, subsequent (Primary)  PE is unremarkable  Preventative labs ordered  Colonoscopy referral placed, patient will call and schedule  CT chest low dose referral placed, patient will call and schedule  Dentist - not going/has dentures.  Checked mouth for cancer, did not see anything  Ophthalmologist - recommend going annually  Dermatologist - denies new skin lesions or moles, was recently at dermatologist and had placed removed  Flu vaccine encouraged in Fall, up-to-date on all other immunizations    2. Type 2 diabetes mellitus with hyperglycemia, without long-term current use of insulin (CMS/[facility])  -     Hemoglobin A1c; Future  -     Microalbumin / Creatinine Urine Ratio - Urine, Clean Catch; Future  -     Ambulatory Referral to Podiatry  -     metFORMIN (Glucophage) 500 MG tablet; Take 1 tablet at night with dinner, increase to 2 tablets at night if tolerating  Dispense: 180 tablet; Refill: 1  Previous hemoglobin AIC was 6.47%, pending labs.  Continue on current medications  Referred to podiatry for feet    3. Essential hypertension  -     metoprolol tartrate (LOPRESSOR) 25 MG tablet; Take 1 tablet by mouth Daily.  Dispense: 90 tablet; Refill: 1  -     ramipril (ALTACE) 2.5 MG capsule; Take 1 capsule by mouth Daily.  Dispense: 90 capsule; Refill: 1  Stable,  well-controlled  Followed by cardiologist    4. Mixed hyperlipidemia  -     Comprehensive Metabolic Panel; Future  -     Lipid Panel; Future  -     rosuvastatin (CRESTOR) 40 MG tablet; Take 1 tablet by mouth Daily.  Dispense: 90 tablet; Refill: 1    5. Coronary artery disease involving native heart without angina pectoris, unspecified vessel or lesion type  Followed by cardiology    6. Cerebrovascular accident (CVA) due to thrombosis of carotid artery, unspecified blood vessel laterality (CMS/HCC)  -     clopidogrel (PLAVIX) 75 MG tablet; Take 1 tablet by mouth Daily.  Dispense: 90 tablet; Refill: 1  On plavix and ASA    7. Pulmonary emphysema, unspecified emphysema type (CMS/HCC)  -     Fluticasone-Umeclidin-Vilant (Trelegy Ellipta) 100-62.5-25 MCG/INH aerosol powder ; Take 1 puff by mouth Daily.  Dispense: 28 each; Refill: 5  -     ipratropium-albuterol (DUO-NEB) 0.5-2.5 mg/3 ml nebulizer; Take 3 mL by nebulization 4 (Four) Times a Day.  Dispense: 360 mL; Refill: 0  -     VENTOLIN  (90 Base) MCG/ACT inhaler; Inhale 2 puffs Every 6 (Six) Hours.  Dispense: 1 inhaler; Refill: 11  Continues to smoke, no desire to quit.  Aware he needs to.  Using inhalers which help with chronic emphysema.    8. Pes anserinus bursitis of right knee  -     diclofenac (VOLTAREN) 1 % gel gel; Apply 4 g topically to the appropriate area as directed 4 (Four) Times a Day As Needed (prn for pain).  Dispense: 60 tube; Refill: 0    9. Skin cancer of arm, left  Removed recently, incision is healing well.  Goes to dermatologist regularly.    10. Tobacco dependence  Continues to smoke, no desire to quit.  Aware he needs to.    11. Encounter for hepatitis C screening test for low risk patient  -     Hepatitis C Antibody; Future    12. Prostate cancer screening  -     PSA Screen; Future    13. Screening for thyroid disorder  -     TSH Rfx On Abnormal To Free T4; Future    14. Screening for deficiency anemia  -     CBC Auto Differential;  Future      Follow Up:  Return in about 3 months (around 9/26/2020) for Recheck, DM.     An After Visit Summary and PPPS were given to the patient.

## 2020-06-26 NOTE — PATIENT INSTRUCTIONS
"Call and schedule colonoscopy.  Call and schedule CT chest low dose for lung screening.  Let me know if you want me to add CT abdo/pelvis for vascular surgeon.  Ask vascular surgeon about circulation in legs.  Return in 2 weeks for labs.  Must be fasting.  Black coffee or water for at least 8 hours.    General Health Maintenance:  -Yearly dermatology exam  -Yearly eye exam if you are diabetic, otherwise every 2 years for patients without diabetes  -Dental exams/cleanings at least twice a year  -Staying current on your required colonoscopy, starts at age 50 unless there are other indications prior  -Prostate exam either manually in office or prostate specific antigen lab testing yearly starting at 50    Vaccines:  -Talk to pharmacist about shingrix shot  -Obtain flu shot when available  -Health department is recommending Hepatitis A vaccine    The largest impact you can have on your own health is getting the proper nutrition, exercise and maintaining an overall healthy body weight.  Proper nutrition involves eating lots of vegetables, fruit, minimal lean meat and avoiding processed foods and limiting refined sugars, carbohydrates and starches (\"the white stuff\").  Drinking at least 8 cups of water daily.  Walking at least 30 minutes a day and if possible, increasing this to a more cardiovascular aerobic exercise.  Maintaining a healthy body weight.      If you smoke or use tobacco products, quitting is extremely important and I am happy to discuss options with you regarding how to do this and what's available to assist you.    If you consume alcohol, limit your alcohol intake to 2 drinks a day for men and 1 drink a day for woman.    It is also important to make sure to keep regular appointments and have all general health maintenance items completed in a timely manner.      Thank you for entrusting me with your care.  It is a pleasure and honor to participate in your health.          "

## 2020-07-02 NOTE — PROGRESS NOTES
[facility] at [facility]  Office Progress Note  Ceasar Woodard  1945  [address] [address]       Visit Date: 07/02/20    PCP: Prema Frias PA-C  [id number] [id number]    IDENTIFICATION: A 75 y.o. male Retired [employer].  Avid PPI    PROBLEM LIST:   1. CAD        1     1996 Status post CABG   2.   2003  MI PCI SVG LAD   3.   4/24/2012 [facility] Occl LAD atretic [place], svg om patent, svg RCA patent  4.  5/19 lexipet fixed inferior EF 59%  2. HTN  3. HLD, on rosuvastatin  4. Complete heart block  1. 12/15/2017 status post [employer] dual-chamber pacemaker implant Dr. Enrrique Deleon  5. CVA      [facility] + PFO  6. AAA  1. 5/29/2019 AAA screen: 6 cm infrarenal abdominal aortic aneurysm with extensive mural thrombus  2. 6/12/2019 endovascular repair of infrarenal AAA Dr. Nichols [facility]   3. claudication  7. T2DM  8. Tobacco abuse- 1ppd       chantix - nausea  9. EtOH abuse  1. Reports 2 bourbons daily  10. COPD  11. Surgical history:  1. CABG  2. Radical scalp skin ca excision       Chief Complaint   Patient presents with   • complete heart block       Allergies  No Known Allergies    Current Medications    Current Outpatient Medications:   •  aspirin 81 MG tablet, Take 81 mg by mouth Daily., Disp: , Rfl:   •  clopidogrel (PLAVIX) 75 MG tablet, Take 1 tablet by mouth Daily., Disp: 90 tablet, Rfl: 1  •  diclofenac (VOLTAREN) 1 % gel gel, Apply 4 g topically to the appropriate area as directed 4 (Four) Times a Day As Needed (prn for pain)., Disp: 60 tube, Rfl: 0  •  Fluticasone-Umeclidin-Vilant (Trelegy Ellipta) 100-62.5-25 MCG/INH aerosol powder , Take 1 puff by mouth Daily., Disp: 28 each, Rfl: 5  •  ipratropium-albuterol (DUO-NEB) 0.5-2.5 mg/3 ml nebulizer, Take 3 mL by nebulization 4 (Four) Times a Day., Disp: 360 mL, Rfl: 0  •  metFORMIN (Glucophage) 500 MG tablet, Take 1 tablet at night with dinner, increase to 2 tablets at night if  "tolerating (Patient taking differently: Take 500 mg by mouth Daily With Breakfast. Take 1 tablet at night with dinner, increase to 2 tablets at night if tolerating), Disp: 180 tablet, Rfl: 1  •  metoprolol tartrate (LOPRESSOR) 25 MG tablet, Take 1 tablet by mouth Daily., Disp: 90 tablet, Rfl: 1  •  nitroglycerin (NITROSTAT) 0.4 MG SL tablet, Place 1 tablet under the tongue Every 5 (Five) Minutes As Needed for Chest Pain. Take no more than 3 doses in 15 minutes., Disp: 30 tablet, Rfl: 1  •  pantoprazole (PROTONIX) 40 MG EC tablet, Take 1 tablet by mouth Daily., Disp: 90 tablet, Rfl: 3  •  ramipril (ALTACE) 2.5 MG capsule, Take 1 capsule by mouth Daily., Disp: 90 capsule, Rfl: 1  •  rosuvastatin (CRESTOR) 40 MG tablet, Take 1 tablet by mouth Daily., Disp: 90 tablet, Rfl: 1      History of Present Illness   Ceasar Woodard is a 75 y.o. year old male here for transition of care. Historically followed per [facility].  Pt continues to smoke and states interested in stopping this year.  Golfs with no sustained aerobic activity.      Pt denies any new  chest pain, dyspnea, dyspnea on exertion, orthopnea, PND, palpitations, lower extremity edema, or claudication.      OBJECTIVE:  Vitals:    07/02/20 1429   BP: 112/68   BP Location: Right arm   Patient Position: Sitting   Pulse: 66   Temp: 98.4 °F (36.9 °C)   SpO2: 97%   Weight: 83.5 kg (184 lb)   Height: 180.3 cm (71\")     Physical Exam   Constitutional: He appears well-developed and well-nourished.   HENT:   Well healed scalp excision.   Neck: Normal range of motion. Neck supple. No hepatojugular reflux and no JVD present. Carotid bruit is not present. No tracheal deviation present. No thyromegaly present.   Cardiovascular: Normal rate, regular rhythm, S1 normal, S2 normal and intact distal pulses. PMI is not displaced. Exam reveals no gallop, no distant heart sounds, no friction rub, no midsystolic click and no opening snap.   Murmur heard.  Pulses:       Radial pulses are 2+ on " the right side, and 2+ on the left side.        Dorsalis pedis pulses are 0 on the right side, and 0 on the left side.        Posterior tibial pulses are 0 on the right side, and 0 on the left side.   Pulmonary/Chest: Effort normal and breath sounds normal. He has no wheezes. He has no rales.   Abdominal: Soft. Bowel sounds are normal. He exhibits no mass. There is no tenderness. There is no guarding.       Diagnostic Data:  Procedures      ASSESSMENT:   Diagnosis Plan   1. Coronary artery disease involving native coronary artery of native heart without angina pectoris     2. Essential hypertension     3. Mixed hyperlipidemia     4. PVD (peripheral vascular disease) with claudication (CMS/HCC)     5. Smoker         PLAN:  Diffuse vasculopathy- discussed focus on modifiable risk factors.  Continued current Rx    PVD - for fu w vascular surgery.  Did well post AAA grafting.    Smoking cessation counseled >10 minutes.    HL on statin    Indefinite DAPT>    Prema Frias PA-C, thank you for referring Mr. Woodard for evaluation.  I have forwarded my electronically generated recommendations to you for review.  Please do not hesitate to call with any questions.      Panchito Mathew MD, FACC

## 2020-07-02 NOTE — TELEPHONE ENCOUNTER
PER DEWAYNE WITH [facility] WOULD LIKE TO KNOW IF THE VENTOLIN CAN BE CHANGED TO AN ALTERNATIVE BECAUSE PT'S INSURANCE DOES NOT COVER. ALTERNATIVE:  ALBUTEROL HSH INHALER OR PROAIR INHALER BY TEVA. PLEASE ADVISE.      PHONE: [phone]     REF: [id number]

## 2020-09-28 PROBLEM — E11.65 TYPE 2 DIABETES MELLITUS WITH HYPERGLYCEMIA, WITHOUT LONG-TERM CURRENT USE OF INSULIN (HCC): Status: ACTIVE | Noted: 2020-01-01

## 2020-09-28 NOTE — PROGRESS NOTES
Chief Complaint   Patient presents with   • Follow-up   • Diabetes   • Hypertension       HPI     Ceasar Woodard is a pleasant 75 y.o. male who is here for routine follow-up of gait instability, balance issues, history of CVA, loss of appetite, fatigue and seasonal allergies.  Patient has been having more frequency and falls recently.  He is hesitant to use a cane or walker.  He has not been to physical therapy for any type of gait training or assessment and states he is willing to go.  He is compliant on his Plavix and aspirin for his history of stroke.  His blood pressure is stable and well-controlled.  He reports worsening fatigue recently and is napping for several hours during the day.  He admits that he is not sleeping well during the night.  He has ongoing allergies and is not currently taking an antihistamine.  He is followed by cardiology and pulmonology.  Wife is present at appointment.    Past Medical History:   Diagnosis Date   • AAA (abdominal aortic aneurysm) (CMS/[facility])    • CAD (coronary artery disease)    • Cancer (CMS/[facility])    • COPD (chronic obstructive pulmonary disease) (CMS/[facility])    • HTN (hypertension)    • Hyperlipidemia    • Hypertension    • Lung nodule    • PAD (peripheral artery disease) (CMS/[facility])    • Stroke (CMS/[facility])        Past Surgical History:   Procedure Laterality Date   • ABDOMINAL AORTIC ANEURYSM REPAIR     • ABDOMINAL SURGERY  06/12/2019   • CORONARY ANGIOPLASTY WITH STENT PLACEMENT     • CORONARY ARTERY BYPASS GRAFT     • PACEMAKER IMPLANTATION     • SKIN GRAFT     • SQUAMOUS CELL CARCINOMA EXCISION         Family History   Problem Relation Age of Onset   • COPD Mother    • Alzheimer's disease Mother    • Stroke Father    • Heart attack Father    • COPD Father        Social History     Socioeconomic History   • Marital status:      Spouse name: Not on file   • Number of children: Not on file   • Years of education: Not on file   • Highest education level: Not on file  "  Tobacco Use   • Smoking status: Current Every Day Smoker     Packs/day: 1.00     Years: 61.00     Pack years: 61.00     Types: Cigarettes     Start date: 1959   • Smokeless tobacco: Never Used   Substance and Sexual Activity   • Alcohol use: Yes     Alcohol/week: 4.0 standard drinks     Types: 4 Shots of liquor per week     Comment: bourbon   • Drug use: No   • Sexual activity: Defer       No Known Allergies    ROS  Review of Systems   Constitutional: Positive for fatigue. Negative for chills, diaphoresis and fever.   HENT: Negative for congestion, postnasal drip and rhinorrhea.    Respiratory: Negative for cough, shortness of breath and wheezing.    Cardiovascular: Negative for chest pain and leg swelling.   Musculoskeletal: Positive for gait problem.   Skin: Positive for skin lesions.   Neurological: Negative for dizziness and headache.   Psychiatric/Behavioral: Positive for sleep disturbance and stress. Negative for self-injury, suicidal ideas and depressed mood. The patient is not nervous/anxious.        Vitals:    09/28/20 0950   BP: 116/74   BP Location: Right arm   Patient Position: Sitting   Cuff Size: Adult   Pulse: 66   SpO2: 97%   Weight: 83.5 kg (184 lb)   Height: 180.3 cm (71\")     Body mass index is 25.66 kg/m².    Current Outpatient Medications on File Prior to Visit   Medication Sig Dispense Refill   • albuterol sulfate HFA (ProAir HFA) 108 (90 Base) MCG/ACT inhaler Inhale 2 puffs Every 4 (Four) Hours As Needed for Wheezing. 3 inhaler 11   • aspirin 81 MG tablet Take 81 mg by mouth Daily.     • clopidogrel (PLAVIX) 75 MG tablet Take 1 tablet by mouth Daily. 90 tablet 1   • diclofenac (VOLTAREN) 1 % gel gel Apply 4 g topically to the appropriate area as directed 4 (Four) Times a Day As Needed (prn for pain). 60 tube 0   • Fluticasone-Umeclidin-Vilant (Trelegy Ellipta) 100-62.5-25 MCG/INH aerosol powder  Take 1 puff by mouth Daily. 28 each 5   • ipratropium-albuterol (DUO-NEB) 0.5-2.5 mg/3 ml " nebulizer Take 3 mL by nebulization 4 (Four) Times a Day. 360 mL 0   • metFORMIN (Glucophage) 500 MG tablet Take 1 tablet at night with dinner, increase to 2 tablets at night if tolerating (Patient taking differently: Take 500 mg by mouth Daily With Breakfast. Take 1 tablet at night with dinner, increase to 2 tablets at night if tolerating) 180 tablet 1   • metoprolol tartrate (LOPRESSOR) 25 MG tablet Take 1 tablet by mouth Daily. 90 tablet 1   • nitroglycerin (NITROSTAT) 0.4 MG SL tablet Place 1 tablet under the tongue Every 5 (Five) Minutes As Needed for Chest Pain. Take no more than 3 doses in 15 minutes. 30 tablet 1   • pantoprazole (PROTONIX) 40 MG EC tablet Take 1 tablet by mouth Daily. 90 tablet 3   • ramipril (ALTACE) 2.5 MG capsule Take 1 capsule by mouth Daily. 90 capsule 1   • rosuvastatin (CRESTOR) 40 MG tablet Take 1 tablet by mouth Daily. 90 tablet 1     No current facility-administered medications on file prior to visit.        Results for orders placed or performed in visit on 08/25/20   CBC Auto Differential    Specimen: Blood   Result Value Ref Range    WBC 9.99 3.40 - 10.80 10*3/mm3    RBC 4.65 4.14 - 5.80 10*6/mm3    Hemoglobin 14.9 13.0 - 17.7 g/dL    Hematocrit 47.1 37.5 - 51.0 %    .3 (H) 79.0 - 97.0 fL    MCH 32.0 26.6 - 33.0 pg    MCHC 31.6 31.5 - 35.7 g/dL    RDW 13.7 12.3 - 15.4 %    RDW-SD 51.8 37.0 - 54.0 fl    MPV 12.5 (H) 6.0 - 12.0 fL    Platelets 206 140 - 450 10*3/mm3    Neutrophil % 53.5 42.7 - 76.0 %    Lymphocyte % 35.2 19.6 - 45.3 %    Monocyte % 5.8 5.0 - 12.0 %    Eosinophil % 4.2 0.3 - 6.2 %    Basophil % 0.7 0.0 - 1.5 %    Immature Grans % 0.6 (H) 0.0 - 0.5 %    Neutrophils, Absolute 5.34 1.70 - 7.00 10*3/mm3    Lymphocytes, Absolute 3.52 (H) 0.70 - 3.10 10*3/mm3    Monocytes, Absolute 0.58 0.10 - 0.90 10*3/mm3    Eosinophils, Absolute 0.42 (H) 0.00 - 0.40 10*3/mm3    Basophils, Absolute 0.07 0.00 - 0.20 10*3/mm3    Immature Grans, Absolute 0.06 (H) 0.00 - 0.05  10*3/mm3    nRBC 0.0 0.0 - 0.2 /100 WBC   Comprehensive Metabolic Panel    Specimen: Blood   Result Value Ref Range    Glucose 95 65 - 99 mg/dL    BUN 11 8 - 23 mg/dL    Creatinine 1.01 0.76 - 1.27 mg/dL    Sodium 142 136 - 145 mmol/L    Potassium 4.5 3.5 - 5.2 mmol/L    Chloride 107 98 - 107 mmol/L    CO2 23.8 22.0 - 29.0 mmol/L    Calcium 9.8 8.6 - 10.5 mg/dL    Total Protein 6.3 6.0 - 8.5 g/dL    Albumin 4.00 3.50 - 5.20 g/dL    ALT (SGPT) 11 1 - 41 U/L    AST (SGOT) 15 1 - 40 U/L    Alkaline Phosphatase 80 39 - 117 U/L    Total Bilirubin 0.3 0.0 - 1.2 mg/dL    eGFR Non African Amer 72 >60 mL/min/1.73    Globulin 2.3 gm/dL    A/G Ratio 1.7 g/dL    BUN/Creatinine Ratio 10.9 7.0 - 25.0    Anion Gap 11.2 5.0 - 15.0 mmol/L   TSH Rfx On Abnormal To Free T4    Specimen: Blood   Result Value Ref Range    TSH 2.580 0.270 - 4.200 uIU/mL   Lipid Panel    Specimen: Blood   Result Value Ref Range    Total Cholesterol 122 0 - 200 mg/dL    Triglycerides 83 0 - 150 mg/dL    HDL Cholesterol 42 40 - 60 mg/dL    LDL Cholesterol  63 0 - 100 mg/dL    VLDL Cholesterol 16.6 5 - 40 mg/dL    LDL/HDL Ratio 1.51    Hemoglobin A1c    Specimen: Blood   Result Value Ref Range    Hemoglobin A1C 6.24 (H) 4.80 - 5.60 %   PSA Screen    Specimen: Blood   Result Value Ref Range    PSA 0.620 0.000 - 4.000 ng/mL   Hepatitis C Antibody    Specimen: Blood   Result Value Ref Range    Hepatitis C Ab Non-Reactive Non-Reactive   Microalbumin / Creatinine Urine Ratio - Urine, Clean Catch    Specimen: Urine, Clean Catch   Result Value Ref Range    Microalbumin/Creatinine Ratio 25.1 mg/g    Creatinine, Urine 83.8 mg/dL    Microalbumin, Urine 2.1 mg/dL       PE    Physical Exam  Vitals signs reviewed.   Constitutional:       General: He is not in acute distress.     Appearance: Normal appearance. He is well-developed and normal weight. He is not ill-appearing or diaphoretic.   HENT:      Head: Normocephalic and atraumatic.   Eyes:      Extraocular Movements:  Extraocular movements intact.      Conjunctiva/sclera: Conjunctivae normal.   Neck:      Musculoskeletal: Normal range of motion.   Cardiovascular:      Rate and Rhythm: Normal rate and regular rhythm.      Heart sounds: Normal heart sounds.   Pulmonary:      Effort: Pulmonary effort is normal.      Breath sounds: Normal breath sounds.   Musculoskeletal: Normal range of motion.      Right lower leg: No edema.      Left lower leg: No edema.   Skin:     General: Skin is warm.      Findings: No erythema or rash.          Neurological:      General: No focal deficit present.      Mental Status: He is alert.      Comments: Gait instability.   Psychiatric:         Attention and Perception: He is attentive.         Mood and Affect: Mood normal.         Speech: Speech normal.         Behavior: Behavior normal. Behavior is cooperative.         Thought Content: Thought content normal.         Judgment: Judgment normal.         A/P    Ceasar was seen today for follow-up, diabetes and hypertension.    Diagnoses and all orders for this visit:    Type 2 diabetes mellitus with hyperglycemia, without long-term current use of insulin (CMS/[facility])  Previous hemoglobin AIC was 6.47%, today it is 6.24%.  Followed by podiatry.  On ACE/statin and metformin.    Essential hypertension  Stable and well-controlled.  Compliant on medications.  Followed by cardiology.    Mixed hyperlipidemia  Rosuvastatin 40 mg.    Frequent falls  Gait instability  -     Ambulatory Referral to Physical Therapy Evaluate and treat; Strengthening; Full weight bearing    Balance problem  -     Ambulatory Referral to Physical Therapy Evaluate and treat; Strengthening; Full weight bearing  Hesitant to use cane or walker.  Recommend PT, referral placed.    Cerebrovascular accident (CVA), unspecified mechanism (CMS/[facility])  -     Ambulatory Referral to Physical Therapy Evaluate and treat; Strengthening; Full weight bearing  On Plavix and aspirin.    Loss of appetite  No  significant weight loss.    Fatigue, unspecified type  Admits to napping for 4 5 hours a day and then not sleeping well at night.  Recommend better sleep hygiene with routine bedtime and less napping.  Pending labs.  Okay to try melatonin for sleep.    Seasonal allergies  -     levocetirizine (Xyzal) 5 MG tablet; Take 1 tablet by mouth Every Evening.    Tobacco dependence  Continues to smoke.    Face lesion  Recommend patient follow-up with dermatology given his history of skin cancer.  Lesion looks like actinic keratosis versus squamous cell.    Flu vaccine need  -     Fluad Quad 65+ yrs ([id number])    Screen for colon cancer  -     Cologuard - Stool, Per Rectum; Future         Plan of care reviewed with patient at the conclusion of today's visit. Education was provided regarding diagnosis, management and any prescribed or recommended OTC medications.  Patient verbalizes understanding of and agreement with management plan.    Return in about 3 months (around 12/28/2020) for Recheck, diabetes.     Prema Frias PA-C

## 2020-12-22 ENCOUNTER — TELEPHONE (OUTPATIENT)
Dept: FAMILY MEDICINE CLINIC | Facility: CLINIC | Age: 75
End: 2020-12-22

## 2020-12-23 DIAGNOSIS — I63.039 CEREBROVASCULAR ACCIDENT (CVA) DUE TO THROMBOSIS OF CAROTID ARTERY, UNSPECIFIED BLOOD VESSEL LATERALITY (HCC): ICD-10-CM

## 2020-12-23 DIAGNOSIS — I10 ESSENTIAL HYPERTENSION: ICD-10-CM

## 2020-12-23 RX ORDER — CLOPIDOGREL BISULFATE 75 MG/1
TABLET ORAL
Qty: 90 TABLET | Refills: 3 | OUTPATIENT
Start: 2020-12-23

## 2020-12-24 DIAGNOSIS — I10 ESSENTIAL HYPERTENSION: ICD-10-CM

## 2020-12-24 RX ORDER — RAMIPRIL 2.5 MG/1
CAPSULE ORAL
Qty: 90 CAPSULE | Refills: 3 | OUTPATIENT
Start: 2020-12-24